# Patient Record
Sex: FEMALE | Race: WHITE | NOT HISPANIC OR LATINO | Employment: UNEMPLOYED | URBAN - NONMETROPOLITAN AREA
[De-identification: names, ages, dates, MRNs, and addresses within clinical notes are randomized per-mention and may not be internally consistent; named-entity substitution may affect disease eponyms.]

---
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
## 2018-03-23 ENCOUNTER — HOSPITAL ENCOUNTER (OUTPATIENT)
Facility: MEDICAL CENTER | Age: 18
DRG: 776 | End: 2018-03-23
Payer: MEDICAID

## 2018-03-23 ENCOUNTER — HOSPITAL ENCOUNTER (INPATIENT)
Facility: MEDICAL CENTER | Age: 18
LOS: 3 days | DRG: 776 | End: 2018-03-26
Attending: HOSPITALIST | Admitting: HOSPITALIST
Payer: MEDICAID

## 2018-03-23 ENCOUNTER — RESOLUTE PROFESSIONAL BILLING HOSPITAL PROF FEE (OUTPATIENT)
Dept: HOSPITALIST | Facility: MEDICAL CENTER | Age: 18
End: 2018-03-23
Payer: MEDICAID

## 2018-03-23 PROBLEM — D72.829 LEUKOCYTOSIS: Status: ACTIVE | Noted: 2018-03-23

## 2018-03-23 PROBLEM — R65.10 SIRS (SYSTEMIC INFLAMMATORY RESPONSE SYNDROME) (HCC): Status: ACTIVE | Noted: 2018-03-23

## 2018-03-23 PROBLEM — D69.6 THROMBOCYTOPENIA (HCC): Status: ACTIVE | Noted: 2018-03-23

## 2018-03-23 PROBLEM — D62 ACUTE BLOOD LOSS ANEMIA: Status: ACTIVE | Noted: 2018-03-23

## 2018-03-23 PROCEDURE — 83605 ASSAY OF LACTIC ACID: CPT

## 2018-03-23 PROCEDURE — 700111 HCHG RX REV CODE 636 W/ 250 OVERRIDE (IP): Performed by: HOSPITALIST

## 2018-03-23 PROCEDURE — 80053 COMPREHEN METABOLIC PANEL: CPT

## 2018-03-23 PROCEDURE — 700102 HCHG RX REV CODE 250 W/ 637 OVERRIDE(OP): Performed by: HOSPITALIST

## 2018-03-23 PROCEDURE — A9270 NON-COVERED ITEM OR SERVICE: HCPCS | Performed by: HOSPITALIST

## 2018-03-23 PROCEDURE — 700105 HCHG RX REV CODE 258: Performed by: HOSPITALIST

## 2018-03-23 PROCEDURE — 85025 COMPLETE CBC W/AUTO DIFF WBC: CPT

## 2018-03-23 PROCEDURE — 99223 1ST HOSP IP/OBS HIGH 75: CPT | Performed by: HOSPITALIST

## 2018-03-23 PROCEDURE — 770022 HCHG ROOM/CARE - ICU (200)

## 2018-03-23 RX ORDER — BISACODYL 10 MG
10 SUPPOSITORY, RECTAL RECTAL
Status: DISCONTINUED | OUTPATIENT
Start: 2018-03-23 | End: 2018-03-26 | Stop reason: HOSPADM

## 2018-03-23 RX ORDER — OXYCODONE HYDROCHLORIDE 5 MG/1
2.5 TABLET ORAL
Status: DISCONTINUED | OUTPATIENT
Start: 2018-03-23 | End: 2018-03-26 | Stop reason: HOSPADM

## 2018-03-23 RX ORDER — PROMETHAZINE HYDROCHLORIDE 25 MG/1
12.5-25 TABLET ORAL EVERY 4 HOURS PRN
Status: DISCONTINUED | OUTPATIENT
Start: 2018-03-23 | End: 2018-03-26 | Stop reason: HOSPADM

## 2018-03-23 RX ORDER — PROMETHAZINE HYDROCHLORIDE 12.5 MG/1
12.5-25 SUPPOSITORY RECTAL EVERY 4 HOURS PRN
Status: DISCONTINUED | OUTPATIENT
Start: 2018-03-23 | End: 2018-03-26 | Stop reason: HOSPADM

## 2018-03-23 RX ORDER — POLYETHYLENE GLYCOL 3350 17 G/17G
1 POWDER, FOR SOLUTION ORAL
Status: DISCONTINUED | OUTPATIENT
Start: 2018-03-23 | End: 2018-03-26 | Stop reason: HOSPADM

## 2018-03-23 RX ORDER — AMOXICILLIN 250 MG
2 CAPSULE ORAL 2 TIMES DAILY
Status: DISCONTINUED | OUTPATIENT
Start: 2018-03-23 | End: 2018-03-26 | Stop reason: HOSPADM

## 2018-03-23 RX ORDER — MORPHINE SULFATE 4 MG/ML
2 INJECTION, SOLUTION INTRAMUSCULAR; INTRAVENOUS
Status: DISCONTINUED | OUTPATIENT
Start: 2018-03-23 | End: 2018-03-26 | Stop reason: HOSPADM

## 2018-03-23 RX ORDER — ONDANSETRON 4 MG/1
4 TABLET, ORALLY DISINTEGRATING ORAL EVERY 4 HOURS PRN
Status: DISCONTINUED | OUTPATIENT
Start: 2018-03-23 | End: 2018-03-26 | Stop reason: HOSPADM

## 2018-03-23 RX ORDER — OXYCODONE HYDROCHLORIDE 5 MG/1
5 TABLET ORAL
Status: DISCONTINUED | OUTPATIENT
Start: 2018-03-23 | End: 2018-03-26 | Stop reason: HOSPADM

## 2018-03-23 RX ORDER — ONDANSETRON 2 MG/ML
4 INJECTION INTRAMUSCULAR; INTRAVENOUS EVERY 4 HOURS PRN
Status: DISCONTINUED | OUTPATIENT
Start: 2018-03-23 | End: 2018-03-26 | Stop reason: HOSPADM

## 2018-03-23 RX ORDER — ACETAMINOPHEN 325 MG/1
650 TABLET ORAL EVERY 6 HOURS PRN
Status: DISCONTINUED | OUTPATIENT
Start: 2018-03-23 | End: 2018-03-26 | Stop reason: HOSPADM

## 2018-03-23 RX ORDER — SODIUM CHLORIDE 9 MG/ML
INJECTION, SOLUTION INTRAVENOUS CONTINUOUS
Status: DISCONTINUED | OUTPATIENT
Start: 2018-03-23 | End: 2018-03-24

## 2018-03-23 RX ADMIN — OXYCODONE HYDROCHLORIDE 5 MG: 5 TABLET ORAL at 20:09

## 2018-03-23 RX ADMIN — OXYCODONE HYDROCHLORIDE 5 MG: 5 TABLET ORAL at 23:36

## 2018-03-23 RX ADMIN — STANDARDIZED SENNA CONCENTRATE AND DOCUSATE SODIUM 2 TABLET: 8.6; 5 TABLET, FILM COATED ORAL at 20:00

## 2018-03-23 RX ADMIN — SODIUM CHLORIDE: 9 INJECTION, SOLUTION INTRAVENOUS at 19:59

## 2018-03-23 RX ADMIN — MEROPENEM 500 MG: 500 INJECTION, POWDER, FOR SOLUTION INTRAVENOUS at 23:29

## 2018-03-23 ASSESSMENT — ENCOUNTER SYMPTOMS
DIZZINESS: 0
COUGH: 1
CHILLS: 0
HEMOPTYSIS: 0
ORTHOPNEA: 0
NAUSEA: 0
PALPITATIONS: 0
VOMITING: 0

## 2018-03-23 ASSESSMENT — PATIENT HEALTH QUESTIONNAIRE - PHQ9
2. FEELING DOWN, DEPRESSED, IRRITABLE, OR HOPELESS: NOT AT ALL
SUM OF ALL RESPONSES TO PHQ9 QUESTIONS 1 AND 2: 0
1. LITTLE INTEREST OR PLEASURE IN DOING THINGS: NOT AT ALL

## 2018-03-23 ASSESSMENT — PAIN SCALES - GENERAL
PAINLEVEL_OUTOF10: 7
PAINLEVEL_OUTOF10: 7

## 2018-03-23 ASSESSMENT — LIFESTYLE VARIABLES: DO YOU DRINK ALCOHOL: NO

## 2018-03-23 NOTE — PROGRESS NOTES
Direct admit from [facility], Dr. Smith, [phone].  Accepted by Dr. Kan for Sepsis.  ADT signed & held @ [id number], needs to be released upon pt arrival.  No written orders received.  Pt coming by airflight.

## 2018-03-23 NOTE — PROGRESS NOTES
Medical records received from [facility]:  H&P, Progress notes, Consults, VS Flowsheets, CXR, and Demographics.  Scanned into Media tab.

## 2018-03-24 PROBLEM — R60.9 EDEMA: Status: ACTIVE | Noted: 2018-03-24

## 2018-03-24 LAB
ALBUMIN SERPL BCP-MCNC: 2 G/DL (ref 3.2–4.9)
ALBUMIN SERPL BCP-MCNC: 2.3 G/DL (ref 3.2–4.9)
ALBUMIN/GLOB SERPL: 0.9 G/DL
ALBUMIN/GLOB SERPL: 1 G/DL
ALP SERPL-CCNC: 68 U/L (ref 30–99)
ALP SERPL-CCNC: 91 U/L (ref 30–99)
ALT SERPL-CCNC: 14 U/L (ref 2–50)
ALT SERPL-CCNC: 17 U/L (ref 2–50)
ANION GAP SERPL CALC-SCNC: 4 MMOL/L (ref 0–11.9)
ANION GAP SERPL CALC-SCNC: 5 MMOL/L (ref 0–11.9)
AST SERPL-CCNC: 24 U/L (ref 12–45)
AST SERPL-CCNC: 27 U/L (ref 12–45)
BASOPHILS # BLD AUTO: 0.3 % (ref 0–1.8)
BASOPHILS # BLD AUTO: 0.3 % (ref 0–1.8)
BASOPHILS # BLD: 0.02 K/UL (ref 0–0.12)
BASOPHILS # BLD: 0.02 K/UL (ref 0–0.12)
BILIRUB SERPL-MCNC: 0.3 MG/DL (ref 0.1–1.5)
BILIRUB SERPL-MCNC: 0.3 MG/DL (ref 0.1–1.5)
BUN SERPL-MCNC: 4 MG/DL (ref 8–22)
BUN SERPL-MCNC: 4 MG/DL (ref 8–22)
CALCIUM SERPL-MCNC: 7.2 MG/DL (ref 8.5–10.5)
CALCIUM SERPL-MCNC: 7.6 MG/DL (ref 8.5–10.5)
CHLORIDE SERPL-SCNC: 107 MMOL/L (ref 96–112)
CHLORIDE SERPL-SCNC: 109 MMOL/L (ref 96–112)
CO2 SERPL-SCNC: 22 MMOL/L (ref 20–33)
CO2 SERPL-SCNC: 23 MMOL/L (ref 20–33)
CREAT SERPL-MCNC: 0.7 MG/DL (ref 0.5–1.4)
CREAT SERPL-MCNC: 0.73 MG/DL (ref 0.5–1.4)
EOSINOPHIL # BLD AUTO: 0.01 K/UL (ref 0–0.51)
EOSINOPHIL # BLD AUTO: 0.01 K/UL (ref 0–0.51)
EOSINOPHIL NFR BLD: 0.1 % (ref 0–6.9)
EOSINOPHIL NFR BLD: 0.1 % (ref 0–6.9)
ERYTHROCYTE [DISTWIDTH] IN BLOOD BY AUTOMATED COUNT: 43.6 FL (ref 35.9–50)
ERYTHROCYTE [DISTWIDTH] IN BLOOD BY AUTOMATED COUNT: 45.1 FL (ref 35.9–50)
GLOBULIN SER CALC-MCNC: 2.1 G/DL (ref 1.9–3.5)
GLOBULIN SER CALC-MCNC: 2.5 G/DL (ref 1.9–3.5)
GLUCOSE SERPL-MCNC: 122 MG/DL (ref 65–99)
GLUCOSE SERPL-MCNC: 99 MG/DL (ref 65–99)
HCT VFR BLD AUTO: 21.9 % (ref 37–47)
HCT VFR BLD AUTO: 31.5 % (ref 37–47)
HGB BLD-MCNC: 10.8 G/DL (ref 12–16)
HGB BLD-MCNC: 7.6 G/DL (ref 12–16)
IMM GRANULOCYTES # BLD AUTO: 0.04 K/UL (ref 0–0.11)
IMM GRANULOCYTES # BLD AUTO: 0.06 K/UL (ref 0–0.11)
IMM GRANULOCYTES NFR BLD AUTO: 0.5 % (ref 0–0.9)
IMM GRANULOCYTES NFR BLD AUTO: 0.9 % (ref 0–0.9)
LACTATE BLD-SCNC: 0.8 MMOL/L (ref 0.5–2)
LYMPHOCYTES # BLD AUTO: 0.91 K/UL (ref 1–4.8)
LYMPHOCYTES # BLD AUTO: 1.18 K/UL (ref 1–4.8)
LYMPHOCYTES NFR BLD: 12.2 % (ref 22–41)
LYMPHOCYTES NFR BLD: 17.4 % (ref 22–41)
MAGNESIUM SERPL-MCNC: 1.6 MG/DL (ref 1.5–2.5)
MCH RBC QN AUTO: 29.6 PG (ref 27–33)
MCH RBC QN AUTO: 30.1 PG (ref 27–33)
MCHC RBC AUTO-ENTMCNC: 34.3 G/DL (ref 33.6–35)
MCHC RBC AUTO-ENTMCNC: 34.7 G/DL (ref 33.6–35)
MCV RBC AUTO: 85.2 FL (ref 81.4–97.8)
MCV RBC AUTO: 87.7 FL (ref 81.4–97.8)
MONOCYTES # BLD AUTO: 0.39 K/UL (ref 0–0.85)
MONOCYTES # BLD AUTO: 0.42 K/UL (ref 0–0.85)
MONOCYTES NFR BLD AUTO: 5.6 % (ref 0–13.4)
MONOCYTES NFR BLD AUTO: 5.8 % (ref 0–13.4)
NEUTROPHILS # BLD AUTO: 5.12 K/UL (ref 2–7.15)
NEUTROPHILS # BLD AUTO: 6.07 K/UL (ref 2–7.15)
NEUTROPHILS NFR BLD: 75.5 % (ref 44–72)
NEUTROPHILS NFR BLD: 81.3 % (ref 44–72)
NRBC # BLD AUTO: 0 K/UL
NRBC # BLD AUTO: 0 K/UL
NRBC BLD-RTO: 0 /100 WBC
NRBC BLD-RTO: 0 /100 WBC
PHOSPHATE SERPL-MCNC: 2.8 MG/DL (ref 2.5–4.5)
PLATELET # BLD AUTO: 104 K/UL (ref 164–446)
PLATELET # BLD AUTO: 88 K/UL (ref 164–446)
PMV BLD AUTO: 10.1 FL (ref 9–12.9)
PMV BLD AUTO: 9.4 FL (ref 9–12.9)
POTASSIUM SERPL-SCNC: 3.2 MMOL/L (ref 3.6–5.5)
POTASSIUM SERPL-SCNC: 3.5 MMOL/L (ref 3.6–5.5)
PROT SERPL-MCNC: 4.1 G/DL (ref 6–8.2)
PROT SERPL-MCNC: 4.8 G/DL (ref 6–8.2)
RBC # BLD AUTO: 2.57 M/UL (ref 4.2–5.4)
RBC # BLD AUTO: 3.59 M/UL (ref 4.2–5.4)
SODIUM SERPL-SCNC: 134 MMOL/L (ref 135–145)
SODIUM SERPL-SCNC: 136 MMOL/L (ref 135–145)
WBC # BLD AUTO: 6.8 K/UL (ref 4.8–10.8)
WBC # BLD AUTO: 7.5 K/UL (ref 4.8–10.8)

## 2018-03-24 PROCEDURE — 700102 HCHG RX REV CODE 250 W/ 637 OVERRIDE(OP): Performed by: INTERNAL MEDICINE

## 2018-03-24 PROCEDURE — 84100 ASSAY OF PHOSPHORUS: CPT

## 2018-03-24 PROCEDURE — 700102 HCHG RX REV CODE 250 W/ 637 OVERRIDE(OP): Performed by: HOSPITALIST

## 2018-03-24 PROCEDURE — 99232 SBSQ HOSP IP/OBS MODERATE 35: CPT | Performed by: HOSPITALIST

## 2018-03-24 PROCEDURE — 80053 COMPREHEN METABOLIC PANEL: CPT

## 2018-03-24 PROCEDURE — A9270 NON-COVERED ITEM OR SERVICE: HCPCS | Performed by: HOSPITALIST

## 2018-03-24 PROCEDURE — 770001 HCHG ROOM/CARE - MED/SURG/GYN PRIV*

## 2018-03-24 PROCEDURE — 83735 ASSAY OF MAGNESIUM: CPT

## 2018-03-24 PROCEDURE — 700111 HCHG RX REV CODE 636 W/ 250 OVERRIDE (IP): Performed by: HOSPITALIST

## 2018-03-24 PROCEDURE — 85025 COMPLETE CBC W/AUTO DIFF WBC: CPT

## 2018-03-24 PROCEDURE — A9270 NON-COVERED ITEM OR SERVICE: HCPCS | Performed by: INTERNAL MEDICINE

## 2018-03-24 RX ORDER — DULOXETIN HYDROCHLORIDE 30 MG/1
60 CAPSULE, DELAYED RELEASE ORAL DAILY
Status: DISCONTINUED | OUTPATIENT
Start: 2018-03-24 | End: 2018-03-26 | Stop reason: HOSPADM

## 2018-03-24 RX ORDER — POTASSIUM CHLORIDE 20 MEQ/1
40 TABLET, EXTENDED RELEASE ORAL 2 TIMES DAILY
Status: DISCONTINUED | OUTPATIENT
Start: 2018-03-24 | End: 2018-03-24

## 2018-03-24 RX ORDER — MAGNESIUM SULFATE HEPTAHYDRATE 40 MG/ML
4 INJECTION, SOLUTION INTRAVENOUS ONCE
Status: COMPLETED | OUTPATIENT
Start: 2018-03-24 | End: 2018-03-24

## 2018-03-24 RX ORDER — POTASSIUM CHLORIDE 20 MEQ/1
40 TABLET, EXTENDED RELEASE ORAL 3 TIMES DAILY
Status: DISCONTINUED | OUTPATIENT
Start: 2018-03-24 | End: 2018-03-25

## 2018-03-24 RX ORDER — FUROSEMIDE 10 MG/ML
20 INJECTION INTRAMUSCULAR; INTRAVENOUS ONCE
Status: COMPLETED | OUTPATIENT
Start: 2018-03-24 | End: 2018-03-24

## 2018-03-24 RX ADMIN — POLYETHYLENE GLYCOL 3350 1 PACKET: 17 POWDER, FOR SOLUTION ORAL at 07:54

## 2018-03-24 RX ADMIN — OXYCODONE HYDROCHLORIDE 5 MG: 5 TABLET ORAL at 15:41

## 2018-03-24 RX ADMIN — POTASSIUM CHLORIDE 40 MEQ: 1500 TABLET, EXTENDED RELEASE ORAL at 14:40

## 2018-03-24 RX ADMIN — ONDANSETRON 4 MG: 2 INJECTION INTRAMUSCULAR; INTRAVENOUS at 23:45

## 2018-03-24 RX ADMIN — OXYCODONE HYDROCHLORIDE 5 MG: 5 TABLET ORAL at 06:05

## 2018-03-24 RX ADMIN — OXYCODONE HYDROCHLORIDE 5 MG: 5 TABLET ORAL at 23:00

## 2018-03-24 RX ADMIN — MORPHINE SULFATE 2 MG: 4 INJECTION INTRAVENOUS at 23:42

## 2018-03-24 RX ADMIN — STANDARDIZED SENNA CONCENTRATE AND DOCUSATE SODIUM 2 TABLET: 8.6; 5 TABLET, FILM COATED ORAL at 19:36

## 2018-03-24 RX ADMIN — POTASSIUM CHLORIDE 40 MEQ: 1500 TABLET, EXTENDED RELEASE ORAL at 19:35

## 2018-03-24 RX ADMIN — MORPHINE SULFATE 2 MG: 4 INJECTION INTRAVENOUS at 20:24

## 2018-03-24 RX ADMIN — STANDARDIZED SENNA CONCENTRATE AND DOCUSATE SODIUM 2 TABLET: 8.6; 5 TABLET, FILM COATED ORAL at 07:54

## 2018-03-24 RX ADMIN — OXYCODONE HYDROCHLORIDE 5 MG: 5 TABLET ORAL at 19:35

## 2018-03-24 RX ADMIN — DULOXETINE HYDROCHLORIDE 60 MG: 60 CAPSULE, DELAYED RELEASE ORAL at 21:37

## 2018-03-24 RX ADMIN — MAGNESIUM SULFATE IN WATER 4 G: 40 INJECTION, SOLUTION INTRAVENOUS at 18:30

## 2018-03-24 RX ADMIN — FUROSEMIDE 20 MG: 10 INJECTION, SOLUTION INTRAMUSCULAR; INTRAVENOUS at 13:30

## 2018-03-24 RX ADMIN — MAGNESIUM HYDROXIDE 30 ML: 400 SUSPENSION ORAL at 07:54

## 2018-03-24 RX ADMIN — OXYCODONE HYDROCHLORIDE 5 MG: 5 TABLET ORAL at 12:41

## 2018-03-24 RX ADMIN — POTASSIUM CHLORIDE 40 MEQ: 1500 TABLET, EXTENDED RELEASE ORAL at 10:37

## 2018-03-24 ASSESSMENT — PAIN SCALES - GENERAL
PAINLEVEL_OUTOF10: 2
PAINLEVEL_OUTOF10: 10
PAINLEVEL_OUTOF10: 0
PAINLEVEL_OUTOF10: 3
PAINLEVEL_OUTOF10: 7
PAINLEVEL_OUTOF10: 10
PAINLEVEL_OUTOF10: 0
PAINLEVEL_OUTOF10: 0
PAINLEVEL_OUTOF10: 7
PAINLEVEL_OUTOF10: 10
PAINLEVEL_OUTOF10: 0

## 2018-03-24 ASSESSMENT — ENCOUNTER SYMPTOMS
MEMORY LOSS: 0
FEVER: 1
WEAKNESS: 0
FALLS: 0
HEADACHES: 0
EYE DISCHARGE: 0
VOMITING: 0
PALPITATIONS: 0
NAUSEA: 0
NAUSEA: 1
FEVER: 0
BLURRED VISION: 0
HEMOPTYSIS: 0
NECK PAIN: 0
ORTHOPNEA: 0
CHILLS: 1
FLANK PAIN: 0
NERVOUS/ANXIOUS: 0
DIARRHEA: 0
BACK PAIN: 0
FOCAL WEAKNESS: 0
HALLUCINATIONS: 0
LOSS OF CONSCIOUSNESS: 0
SEIZURES: 0
SPUTUM PRODUCTION: 0
ABDOMINAL PAIN: 1
STRIDOR: 0
COUGH: 0
SHORTNESS OF BREATH: 0
BLOOD IN STOOL: 0
EYE REDNESS: 0
EYE PAIN: 0
SPEECH CHANGE: 0

## 2018-03-24 ASSESSMENT — LIFESTYLE VARIABLES
SUBSTANCE_ABUSE: 0
DO YOU DRINK ALCOHOL: NO

## 2018-03-24 NOTE — PROGRESS NOTES
Pulmonary Critical Care Progress Note        Date of Service: 3/24/2018  Chief Complaint: fevers and chills    History of Present Illness: 17 y.o. female admitted from [facility] for SIRS syndrome and possible severe sepsis.  She underwent vaginal delivery on 3/21 for a full term pregnancy and the baby had respiratory distress with low Apgars and 30 minute resuscitation with transfer to [facility].  The patient 's delivery was complicated by postpartum hemorrhage and suspected uterine rupture requiring hysterectomy.  She did receive 5 units pRBCs and 2 units of FFP.  On the day of transfer, the patient had sudden onset of rigors and fevers with tachycardia.  She was given vanco, gent, and meropenem and transferred to [facility] for higher level of care.    ROS:  No shortness of breath/CP/palpitations/fever/chills/n/v/d, c/o rash to abdomen that isn't itchy.  Mild abdominal distension and discomfort.  Mild vaginal discharge.  No dysuria/hematuria.  Hungry. Also c/o swelling to hands and feet    Interval Events:  24 hour interval history reviewed    - transferred from [facility]   - s/p vaginal birth with placenta previa and hemorrhaged with massive transfusion   - no issues overnight   - a/ox4   - slight pain to abdomen   - SR/ST 90-100s   - -120s   - IS 1750cc   - last BM on 3/21   - O2 2 lpm NC   - tolerating regular diet   - Tmax 99.9   - UOP adequate with commode   - NS at 100cc   - having nausea/vomiting this morning   - no CXR today   - meropenem day #2 (has had vanco/gent)   - K at 3.5   - no DVT prophylaxis   - platelets at 88   - Hb of 7.6    PFSH:  No change.  Gen: pleasant/cooperative, speaking in full sentences, appears stated age, no distress while ambulating around ICU room  Skin: warm/dry, noted faint papular rash across abdomen, no bruising  Ext: FROM noted to RUE, RLE, LLE, LUE without joint effusions or erythema, no popliteal adenopathy, trace pedal edema bilat  Neck: supple with FROM,  no thyromegaly, no cervical adenopathy    Respiratory:     Pulse Oximetry: 93 %    Exam: unlabored respirations, no intercostal retractions or accessory muscle use and clear to auscultation without rales or wheezes  ImagingAvailable data reviewed         Invalid input(s): [id number]    HemoDynamics:  Pulse: (!) 109, Heart Rate (Monitored): (!) 111  Blood Pressure: 109/47, NIBP: 119/72       Exam: RRR, no murmur, good cap refill, 2+ dpp=, trace pedal edema  Imaging: Available data reviewed        Neuro:  GCS         Exam: no focal deficits noted mental status intact oriented for age x3 Motor and sensory exam grossly intact  Imaging: Available data reviewed    Fluids:  Intake/Output     None        Weight: 82 kg (180 lb 12.4 oz)  Recent Labs      18   235   SODIUM  134*   POTASSIUM  3.5*   CHLORIDE  107   CO2  23   BUN  4*   CREATININE  0.70   CALCIUM  7.2*       GI/Nutrition:  Exam: mild tenderness suprapubic, no guarding, abdominal incision is clean, dry, and intact, normal active bowel sounds, without masses or organomegaly  Imaging: Available data reviewed  taking PO  Liver Function  Recent Labs      18   2359   ALTSGPT  14   ASTSGOT  24   ALKPHOSPHAT  68   TBILIRUBIN  0.3   GLUCOSE  99       Heme:  Recent Labs      18   235   RBC  2.57*   HEMOGLOBIN  7.6*   HEMATOCRIT  21.9*   PLATELETCT  88*       Infectious Disease:  Temp  Av.2 °C (98.9 °F)  Min: 36.2 °C (97.2 °F)  Max: 37.7 °C (99.9 °F)  Micro: antibiotics reviewed and cultures reviewed  Recent Labs      18   2359   WBC  6.8   NEUTSPOLYS  75.50*   LYMPHOCYTES  17.40*   MONOCYTES  5.80   EOSINOPHILS  0.10   BASOPHILS  0.30   ASTSGOT  24   ALTSGPT  14   ALKPHOSPHAT  68   TBILIRUBIN  0.3     Current Facility-Administered Medications   Medication Dose Frequency Provider Last Rate Last Dose   • senna-docusate (PERICOLACE or SENOKOT S) 8.6-50 MG per tablet 2 Tab  2 Tab BID Oleg Kan M.D.   2 Tab at 18    And   •  polyethylene glycol/lytes (MIRALAX) PACKET 1 Packet  1 Packet QDAY PRN Oleg Kan M.D.        And   • magnesium hydroxide (MILK OF MAGNESIA) suspension 30 mL  30 mL QDAY PRN Oleg Kan M.D.        And   • bisacodyl (DULCOLAX) suppository 10 mg  10 mg QDAY PRN Oleg Kan M.D.       • NS infusion   Continuous Oleg Kan M.D. 100 mL/hr at 03/23/18 1959     • ondansetron (ZOFRAN) syringe/vial injection 4 mg  4 mg Q4HRS PRN Oleg Kan M.D.       • ondansetron (ZOFRAN ODT) dispertab 4 mg  4 mg Q4HRS PRN Oleg Kan M.D.       • promethazine (PHENERGAN) tablet 12.5-25 mg  12.5-25 mg Q4HRS PRSHINE Kan M.D.       • promethazine (PHENERGAN) suppository 12.5-25 mg  12.5-25 mg Q4HRS PRN Oleg Kan M.D.       • prochlorperazine (COMPAZINE) injection 5-10 mg  5-10 mg Q4HRS PRSHINE Kan M.D.       • acetaminophen (TYLENOL) tablet 650 mg  650 mg Q6HRS PRSHINE Kan M.D.       • Pharmacy Consult Request ...Pain Management Review   PRN Oleg Kan M.D.        And   • oxyCODONE immediate-release (ROXICODONE) tablet 2.5 mg  2.5 mg Q3HRS PRN Oleg Kan M.D.        And   • oxyCODONE immediate-release (ROXICODONE) tablet 5 mg  5 mg Q3HRS PRSHINE Kan M.D.   5 mg at 03/24/18 0605    And   • morphine (pf) 4 mg/ml injection 2 mg  2 mg Q3HRS PRSHINE Kan M.D.         Last reviewed on 7/23/2017  2:24 PM by Harleen Dukes P.A.-C.    Quality  Measures:  EKG reviewed, Medications reviewed, Labs reviewed and Radiology images reviewed  Douglas catheter: No Douglas        DVT prophylaxis - mechanical: SCDs  Ulcer prophylaxis: Not indicated  Antibiotics: Treating active infection/contamination beyond 24 hours perioperative coverage      Problems/Plan:    Acute SIRS syndrome   - fever/chills and leukocytosis have resolved   - rash still present   - s/p meropenem/vanco   - s/p IVF   Acute Rash   - query drug reaction vs transfusion reaction   - follow   - no medications now  Acute Uterine Ruptured   -  s/p hysterectomy POD#2   - s/p massive transfusion resuscitation   - hb stable  Postpartum day 2 after vaginal delivery   - transfer to postpartum   - following hb  Thrombocytopenia   - watch for HELLP   - check CMP tomorrow  Acute Peripheral Edema   - checking LE ultrasound for DVT  Prophylaxis   - SCDs    Pt is stable for transfer out of the ICU to postpartum.  The [facility] intensivist group will sign off upon transfer.    Discussed patient condition and risk of morbidity and/or mortality with Family, RN, RT, Therapies, Pharmacy, Dietary, , Charge nurse / hot rounds, Patient and hospitalist.    [id number]

## 2018-03-24 NOTE — PROGRESS NOTES
Patient assessed and is having some complaints of pain. See MAR for intervention. No other complaints or needs are voiced at this time. Call light within reach. Will continue to monitor.

## 2018-03-24 NOTE — PROGRESS NOTES
Bed side report received from day shift nurse. Patient assessed and resting comfortably. No complaints of pain or needs are voiced at this time. Call light within reach. Will continue to monitor.

## 2018-03-24 NOTE — PROGRESS NOTES
Patient assessed and resting comfortably at this time. No complaints of pain or other needs are voiced. No significant changes noted since previous assessment. Call light within reach. Continuing to monitor.

## 2018-03-24 NOTE — CONSULTS
Critical Care/Pulmonary Consultation    Date of service: 3/23/2018    Consulting Physician: Oleg Kan M.D.    Chief Complaint:  Hypotension, query sepsis    History of Present Illness: This is a 17-year-old female who underwent a vaginal delivery on 3/21. The baby was born with respiratory distress and low Apgars and was transferred to [facility]. Patient's delivery was complicated by postpartum hemorrhage requiring D&C which was unsuccessful and subsequent hysterectomy. She received 5 units of PRBCs in addition to FFP. Today she had a fever associated with tachycardia and shaking riders. She additionally had some flushing of her face. She was given IV fluid resuscitation and initiated on broad-spectrum antibiotics including meropenem and vancomycin. She was transferred to [facility] for further care. CT scan of the chest showed no evidence of pulmonary embolism. There were small bilateral pleural effusions. Symptoms are now improved. She did have a MAXIMUM TEMPERATURE of 101°F.    Review of Systems   Constitutional: Positive for chills and fever.   Eyes: Negative for blurred vision.   Respiratory: Negative for cough and hemoptysis.    Cardiovascular: Negative for chest pain.   Gastrointestinal: Positive for abdominal pain and nausea.   Skin: Negative for rash.       No current facility-administered medications on file prior to encounter.      Current Outpatient Prescriptions on File Prior to Encounter   Medication Sig Dispense Refill   • MethylPREDNISolone (MEDROL DOSEPAK) 4 MG Tablet Therapy Pack Take 1 Tab by mouth See Admin Instructions. 21 Tab 0   • Dextromethorphan Polistirex ER (ROBITUSSIN 12 HOUR COUGH) 30 MG/5ML Suspension Extended Release Take 60 mg by mouth 2 Times a Day.     • guaifenesin-codeine (ROBITUSSIN AC) Solution oral solution Take 5 mL by mouth at bedtime as needed for Cough. 120 mL 0   • fluticasone (FLONASE) 50 MCG/ACT nasal spray Spray 1 Spray in nose 2 times a day. 1  "Bottle 0   • phenazopyridine (PYRIDIUM) 200 MG Tab Take 1 Tab by mouth 3 times a day as needed. 6 Tab 0   • DULOXETINE HCL PO Take  by mouth.     • naproxen (NAPROSYN) 375 MG Tab Take 1 Tab by mouth 2 times a day, with meals. 60 Tab 0   • TRI-SPRINTEC 0.18/0.215/0.25 MG-35 MCG TABS TAKE 1 TABLET BY MOUTH DAILY 1 Tab 1       Social History   Substance Use Topics   • Smoking status: Never Smoker   • Smokeless tobacco: Never Used   • Alcohol use No        Past Medical History:   Diagnosis Date   • Allergy    • Asthma, exercise induced    • FHx: cancer     MGGM brain and colon       Past Surgical History:   Procedure Laterality Date   • FINGER ORIF  11/19/2010    Performed by WANG ROGERS at SURGERY SAME DAY [facility]S       Allergies: Clindamycin; Pcn [penicillins]; and Suprax [cefixime]    Family History   Problem Relation Age of Onset   • Psychiatry Mother      ANXIETY   • Lung Disease Maternal Aunt      ASTHMA   • Lung Disease Maternal Uncle      ASTHMA   • Heart Disease Maternal Grandfather      heart attack at age 45       Vitals:    03/23/18 1500 03/23/18 2000 03/23/18 2017   Height: 1.651 m (5' 5\")     Weight: 82 kg (180 lb 12.4 oz)     Weight % change since last entry.: 0 %     BP:  109/47    Pulse:   (!) 109   BMI (Calculated): 30.08     Resp:   (!) 47   Temp:  37.6 °C (99.6 °F)        Physical Examination  General: Awake, not in distress,  Neuro/Psych: Oriented ×4, moves all extremities symmetrically, strength 5/5 throughout, normal sensory exam, normal mentation and judgment  HEENT: PERRL, trachea midline, supple, no crepitus  CVS: Regular rate and rhythm, no murmur  Respiratory: Clear throughout, no wheezing  Abdomen/: Low transverse incision well approximated staples in place, abdomen soft, mild tenderness  Extremities: 1+ lower extremity edema, distal pulses palpable throughout, capillary refill less than 2 seconds  Skin: Warm, dry, no rash        No results for input(s): SODIUM, POTASSIUM, " CHLORIDE, CO2, BUN, CREATININE, MAGNESIUM, PHOSPHORUS, CALCIUM in the last 72 hours.  No results for input(s): ALTSGPT, ASTSGOT, ALKPHOSPHAT, TBILIRUBIN, DBILIRUBIN, GAMMAGT, AMYLASE, LIPASE, ALB, PREALBUMIN, GLUCOSE in the last 72 hours.        Invalid input(s): [id number]  No orders to display       Assessment and Plan:  Fevers, chills, rash   - Resolving, query transfusion reaction versus sepsis   - Possible pelvic/GYN source   - Status post IV fluid resuscitation   - Ongoing broad-spectrum antimicrobial therapy, follow cultures  Postpartum day 2 after vaginal delivery   - Postpartum hemorrhage necessitating D&C and ultimately emergent hysterectomy   - Follow serial hemoglobin, physical exam, consult with OB in the morning  Thrombocytopenia   - Follow, TEG if further bleeding  The patient is critically ill. She is at risk for further vital organ deterioration. We will follow closely in the intensive care unit further recommendations to follow crit. Management as above.  The patient remains critically ill.  Critical care time = 36 minutes in directly providing and coordinating critical care and extensive data review.  No time overlap and excludes procedures.

## 2018-03-24 NOTE — H&P
Hospital Medicine History and Physical    Date of Service  3/23/2018    Chief Complaint  Fever and Chills    History of Presenting Illness  17 y.o. female who presented 3/23/2018 with fever and chills.    Ms Merlos has minimal past medical history.  She had a vaginal delivery on 3/21, subsequently had uncontrolled hemorrhage.  She was taken emergently to the OR, D and C was unsuccessful in controlling the bleeding and she required a hysterectomy, estimated blood loss was 2,500ml. Patient required a total of 5 units of PRBC's and 3 units of plasma.  She has been improving under the care of her OB/GYN and the hospitalist at [facility].  Today the patient spiked a fever and became tachycardic.  She was very uncomfortable with rigors and developed flushing of her face.  Endorses palpitations.  She was given IV fluids and started on broad spectrum antibiotics.  The hospitalist contacted me to request transfer to [facility] for higher level of care and specialty coverage.  Prior to transfer the patient did have a CT scan of the abdomen and pelvis which showed only post operative changes with some clots reported as large as 7 x 6 cm.  Also had a CT scan of the chest shows no evidence of pulmonary embolism, there are small bilateral pleural effusions.  Patient rec'd vancomycin and gentamycin perioperative, these antibiotics ended on 3/22.  Today she rec'd meropenum and vancomycin on concern that her fever and tachycardia were related to infection. She arrives at the ICU with a fever of 101 degrees F, overall she is feeling much better, tachycardia and palpitations have resolved.  Complains of incisional pain, worse when she coughs.    Primary Care Physician  Lm Vivar M.D.    OB Gyn: Dr Mamadou Franklin  Consultants  None    Code Status  Full Code    Review of Systems  Review of Systems   Constitutional: Negative for chills and malaise/fatigue.   Respiratory: Positive for cough. Negative for hemoptysis.    Cardiovascular:  Negative for chest pain, palpitations and orthopnea.   Gastrointestinal: Negative for nausea and vomiting.   Genitourinary: Negative for dysuria and urgency.   Skin: Negative for itching and rash.   Neurological: Negative for dizziness.   All other systems reviewed and are negative.       Past Medical History  Past Medical History:   Diagnosis Date   • Allergy    • Asthma, exercise induced    • FHx: cancer     MGGM brain and colon       Surgical History  Past Surgical History:   Procedure Laterality Date   • FINGER ORIF  11/19/2010    Performed by WANG ROGERS at SURGERY SAME DAY [facility]       Medications  No current facility-administered medications on file prior to encounter.      Current Outpatient Prescriptions on File Prior to Encounter   Medication Sig Dispense Refill   • MethylPREDNISolone (MEDROL DOSEPAK) 4 MG Tablet Therapy Pack Take 1 Tab by mouth See Admin Instructions. 21 Tab 0   • Dextromethorphan Polistirex ER (ROBITUSSIN 12 HOUR COUGH) 30 MG/5ML Suspension Extended Release Take 60 mg by mouth 2 Times a Day.     • guaifenesin-codeine (ROBITUSSIN AC) Solution oral solution Take 5 mL by mouth at bedtime as needed for Cough. 120 mL 0   • fluticasone (FLONASE) 50 MCG/ACT nasal spray Spray 1 Spray in nose 2 times a day. 1 Bottle 0   • phenazopyridine (PYRIDIUM) 200 MG Tab Take 1 Tab by mouth 3 times a day as needed. 6 Tab 0   • DULOXETINE HCL PO Take  by mouth.     • naproxen (NAPROSYN) 375 MG Tab Take 1 Tab by mouth 2 times a day, with meals. 60 Tab 0   • TRI-SPRINTEC 0.18/0.215/0.25 MG-35 MCG TABS TAKE 1 TABLET BY MOUTH DAILY 1 Tab 1       Family History  Family History   Problem Relation Age of Onset   • Psychiatry Mother      ANXIETY   • Lung Disease Maternal Aunt      ASTHMA   • Lung Disease Maternal Uncle      ASTHMA   • Heart Disease Maternal Grandfather      heart attack at age 45       Social History  Social History   Substance Use Topics   • Smoking status: Never Smoker   • Smokeless  "tobacco: Never Used   • Alcohol use No       Allergies  Allergies   Allergen Reactions   • Clindamycin      Severe stomach pains     • Pcn [Penicillins] Vomiting     SEVERE VOMITING   • Suprax [Cefixime] Rash and Vomiting        Physical Exam  Laboratory   Ht 1.651 m (5' 5\")   Wt 82 kg (180 lb 12.4 oz)   BMI 30.08 kg/m²    138/80, , RR 12, O2 saturation 95% on RA          Physical Exam   Constitutional: She is oriented to person, place, and time. She appears well-developed and well-nourished.   HENT:   Head: Normocephalic and atraumatic.   Eyes: Conjunctivae and EOM are normal. Right eye exhibits no discharge. Left eye exhibits no discharge.   Cardiovascular: Normal rate, regular rhythm and intact distal pulses.    No murmur heard.  Pulmonary/Chest: Effort normal and breath sounds normal. No respiratory distress. She has no wheezes.   Abdominal: Soft. Bowel sounds are normal. She exhibits no distension. There is no tenderness. There is no rebound.   Transverse incision with staples   Musculoskeletal: Normal range of motion. She exhibits no edema.   Neurological: She is alert and oriented to person, place, and time. No cranial nerve deficit.   Skin: Skin is warm and dry. No rash noted. She is not diaphoretic. No erythema. No pallor.   Psychiatric: She has a normal mood and affect. Her behavior is normal. Thought content normal.       (from OSH):  WBC 7.8, HGB 8.3, Plt 98, BUN 6, Creatine 0.78 , Fibrinogen WNL   Assessment/Plan     I anticipate this patient will require at least two midnights for appropriate medical management, necessitating inpatient admission.    * SIRS (systemic inflammatory response syndrome) (CMS-HCC)- (present on admission)   Assessment & Plan    Patient had fever, chills, and tachycardia  Unclear if this is due to infection or other, ?tranfusion related  She is improved  Will continue broad spectrum antibiotics for now  Await final culture results  Lactic acid is normal, blood pressure " is stable        Leukocytosis- (present on admission)   Assessment & Plan    Reactive vs due to new infection  Follow labs, treat as above        Postpartum hemorrhage- (present on admission)   Assessment & Plan    Now s/p emergent hysterectomy        Acute blood loss anemia- (present on admission)   Assessment & Plan    S/P 5 units PRBC's and 3 units plasma  Monitor for bleeding  Check CBC now  No evidence of hemolysis per outside hospital        Thrombocytopenia (CMS-HCC)- (present on admission)   Assessment & Plan    Mild thrombocytopenia, no hemolysis  Will follow            VTE prophylaxis: SCD's.

## 2018-03-24 NOTE — CARE PLAN
Problem: Communication  Goal: The ability to communicate needs accurately and effectively will improve  Outcome: PROGRESSING AS EXPECTED  Patient communicates needs accurately and effectively by using the call light when in need of assistance. Call light within reach. Continuing to monitor.

## 2018-03-24 NOTE — CARE PLAN
Problem: Bowel/Gastric:  Goal: Will not experience complications related to bowel motility  All bowel meds given this AM to prevent further constipation    Problem: Pain Management  Goal: Pain level will decrease to patient's comfort goal  Spoke with pt about current pain regimen, pt states current meds are working adequately

## 2018-03-24 NOTE — ASSESSMENT & PLAN NOTE
s/p emergent hysterectomy  No signs of active bleed at this time  Check iron levels and replete accordingly  I spoke with   On 3-24 since pt is stable and hg improved no further recommendationsfrom OB  Reviewed CT report from outlying facility clots noted but no active bleeding    Patient counseled on benefits of breast-feeding she is not interested in breast-feeding at this time

## 2018-03-24 NOTE — PROGRESS NOTES
[facility] Hospitalist Progress Note    Date of Service: 3/24/2018    Chief Complaint  17 y.o. female admitted 3/23/2018 with possible sepsis admitted to hospital in [place]  for delivery complicated by significant bleeding requiring hysterectomy. She had fever chills and facial rash and there was concern for possible sepsis she was given broad-spectrum antibiotics and transferred to our facility.    Interval Problem Update  She is afebrile this morning, denies dyspnea, has generalized edema    Consultants/Specialty  CC    Disposition  TBD        Review of Systems   Constitutional: Negative for fever and malaise/fatigue.   HENT: Negative for congestion, ear discharge and nosebleeds.    Eyes: Negative for blurred vision, pain, discharge and redness.   Respiratory: Negative for cough, hemoptysis, sputum production, shortness of breath and stridor.    Cardiovascular: Positive for leg swelling. Negative for chest pain, palpitations and orthopnea.   Gastrointestinal: Positive for abdominal pain. Negative for blood in stool, diarrhea, melena, nausea and vomiting.   Genitourinary: Negative for dysuria, flank pain and hematuria.   Musculoskeletal: Negative for back pain, falls, joint pain and neck pain.   Skin: Negative.    Neurological: Negative for speech change, focal weakness, seizures, loss of consciousness, weakness and headaches.   Psychiatric/Behavioral: Negative for hallucinations, memory loss and substance abuse. The patient is not nervous/anxious.    All other systems reviewed and are negative.     Physical Exam  Laboratory/Imaging   Hemodynamics  Temp (24hrs), Av °C (98.6 °F), Min:36.2 °C (97.2 °F), Max:37.7 °C (99.9 °F)   Temperature: 37.1 °C (98.7 °F)  Pulse  Av  Min: 80  Max: 109 Heart Rate (Monitored): 96  Blood Pressure: 109/47, NIBP: 119/77      Respiratory      Respiration: 20, Pulse Oximetry: 96 %             Fluids    Intake/Output Summary (Last 24 hours) at 18 6969  Last data filed at  03/24/18 1400   Gross per 24 hour   Intake              360 ml   Output             1600 ml   Net            -1240 ml       Nutrition  Orders Placed This Encounter   Procedures   • Diet Order     Standing Status:   Standing     Number of Occurrences:   1     Order Specific Question:   Diet:     Answer:   Regular [1]     Physical Exam   Constitutional: She is oriented to person, place, and time. She appears well-developed and well-nourished.   HENT:   Head: Normocephalic and atraumatic.   Right Ear: External ear normal.   Left Ear: External ear normal.   Mouth/Throat: No oropharyngeal exudate.   Eyes: Conjunctivae are normal. Right eye exhibits no discharge. Left eye exhibits no discharge. No scleral icterus.   Neck: Neck supple. No JVD present. No tracheal deviation present.   Cardiovascular: Normal rate and regular rhythm.  Exam reveals no gallop and no friction rub.    No murmur heard.  Pulmonary/Chest: Effort normal and breath sounds normal. No stridor. No respiratory distress. She has no wheezes. She has no rales. She exhibits no tenderness.   Abdominal: Soft. Bowel sounds are normal. She exhibits distension. She exhibits no mass. There is tenderness. There is no rebound and no guarding.   Abdominal wall edema  Tenderness lower abdomen around her incision  Surgical wound with staples in place healing with no signs of infection   Musculoskeletal: She exhibits edema. She exhibits no tenderness.   Neurological: She is alert and oriented to person, place, and time. No cranial nerve deficit. She exhibits normal muscle tone.   Skin: Skin is warm and dry. She is not diaphoretic. No cyanosis. Nails show no clubbing.   Psychiatric: She has a normal mood and affect. Her behavior is normal.   Nursing note and vitals reviewed.      Recent Labs      03/23/18   2359  03/24/18   0956   WBC  6.8  7.5   RBC  2.57*  3.59*   HEMOGLOBIN  7.6*  10.8*   HEMATOCRIT  21.9*  31.5*   MCV  85.2  87.7   MCH  29.6  30.1   MCHC  34.7  34.3    RDW  43.6  45.1   PLATELETCT  88*  104*   MPV  9.4  10.1     Recent Labs      03/23/18   2359  03/24/18   0956   SODIUM  134*  136   POTASSIUM  3.5*  3.2*   CHLORIDE  107  109   CO2  23  22   GLUCOSE  99  122*   BUN  4*  4*   CREATININE  0.70  0.73   CALCIUM  7.2*  7.6*                      Assessment/Plan     * SIRS (systemic inflammatory response syndrome) (CMS-HCC)- (present on admission)   Assessment & Plan    Patient had fever, chills, and tachycardia  Clinically does not appear toxic or septic at this time  Will discontinue antibiotics  Continue close clinical monitoring        Leukocytosis- (present on admission)   Assessment & Plan    resolved        Postpartum hemorrhage- (present on admission)   Assessment & Plan    s/p emergent hysterectomy  Monitor CBC and clinically  Wound care  I spoke with  since pt is stable and hg improved no further recommendations at this time, she will be available for assistance if needed        Acute blood loss anemia- (present on admission)   Assessment & Plan    S/P 5 units PRBC's and 3 units plasma    Hemoglobin improved  No clinical signs of active bleeding at this time  Continue to monitor        Edema   Assessment & Plan    Likely secondary to fluid resuscitation  Will check lower extremity duplex  Will give one dose of  Lasix    If  CBC remains stable consider starting pharmacologic DVT prophylaxis tomorrow        Thrombocytopenia (CMS-HCC)- (present on admission)   Assessment & Plan    Improved likely  Reactive  Monitor CBC          Quality-Core Measures   Reviewed items::  Labs reviewed, Medications reviewed and Radiology images reviewed  Douglas catheter::  No Douglas  DVT prophylaxis pharmacological::  Contraindicated - High bleeding risk  DVT prophylaxis - mechanical:  SCDs      Plan of care reviewed with patient and discussed with nursing staff  And Dr beebe

## 2018-03-24 NOTE — ASSESSMENT & PLAN NOTE
S/P 5 units PRBC's and 3 units plasma    Hemoglobin overall stable   No clinical signs of active bleeding at this time  Continue to monitor

## 2018-03-24 NOTE — CARE PLAN
Problem: Safety  Goal: Will remain free from injury  Outcome: PROGRESSING AS EXPECTED  Patient remains free from injury

## 2018-03-25 PROBLEM — D72.829 LEUKOCYTOSIS: Status: RESOLVED | Noted: 2018-03-23 | Resolved: 2018-03-25

## 2018-03-25 PROBLEM — K59.00 CONSTIPATION: Status: ACTIVE | Noted: 2018-03-25

## 2018-03-25 PROBLEM — E61.1 IRON DEFICIENCY: Status: ACTIVE | Noted: 2018-03-25

## 2018-03-25 LAB
ALBUMIN SERPL BCP-MCNC: 2.1 G/DL (ref 3.2–4.9)
ALBUMIN/GLOB SERPL: 1 G/DL
ALP SERPL-CCNC: 69 U/L (ref 30–99)
ALT SERPL-CCNC: 12 U/L (ref 2–50)
ANION GAP SERPL CALC-SCNC: 6 MMOL/L (ref 0–11.9)
AST SERPL-CCNC: 16 U/L (ref 12–45)
BASOPHILS # BLD AUTO: 0.1 % (ref 0–1.8)
BASOPHILS # BLD: 0.01 K/UL (ref 0–0.12)
BILIRUB SERPL-MCNC: 0.3 MG/DL (ref 0.1–1.5)
BUN SERPL-MCNC: 5 MG/DL (ref 8–22)
CALCIUM SERPL-MCNC: 7.8 MG/DL (ref 8.5–10.5)
CHLORIDE SERPL-SCNC: 108 MMOL/L (ref 96–112)
CO2 SERPL-SCNC: 22 MMOL/L (ref 20–33)
CREAT SERPL-MCNC: 0.64 MG/DL (ref 0.5–1.4)
EOSINOPHIL # BLD AUTO: 0.09 K/UL (ref 0–0.51)
EOSINOPHIL NFR BLD: 0.9 % (ref 0–6.9)
ERYTHROCYTE [DISTWIDTH] IN BLOOD BY AUTOMATED COUNT: 43.8 FL (ref 35.9–50)
FERRITIN SERPL-MCNC: 126.4 NG/ML (ref 10–291)
GLOBULIN SER CALC-MCNC: 2.2 G/DL (ref 1.9–3.5)
GLUCOSE SERPL-MCNC: 101 MG/DL (ref 65–99)
HCT VFR BLD AUTO: 26.8 % (ref 37–47)
HGB BLD-MCNC: 9.2 G/DL (ref 12–16)
IMM GRANULOCYTES # BLD AUTO: 0.08 K/UL (ref 0–0.11)
IMM GRANULOCYTES NFR BLD AUTO: 0.8 % (ref 0–0.9)
IRON SATN MFR SERPL: 5 % (ref 15–55)
IRON SERPL-MCNC: 16 UG/DL (ref 40–170)
LYMPHOCYTES # BLD AUTO: 1.2 K/UL (ref 1–4.8)
LYMPHOCYTES NFR BLD: 11.5 % (ref 22–41)
MAGNESIUM SERPL-MCNC: 2.1 MG/DL (ref 1.5–2.5)
MCH RBC QN AUTO: 29.6 PG (ref 27–33)
MCHC RBC AUTO-ENTMCNC: 34.3 G/DL (ref 33.6–35)
MCV RBC AUTO: 86.2 FL (ref 81.4–97.8)
MONOCYTES # BLD AUTO: 0.65 K/UL (ref 0–0.85)
MONOCYTES NFR BLD AUTO: 6.2 % (ref 0–13.4)
NEUTROPHILS # BLD AUTO: 8.38 K/UL (ref 2–7.15)
NEUTROPHILS NFR BLD: 80.5 % (ref 44–72)
NRBC # BLD AUTO: 0 K/UL
NRBC BLD-RTO: 0 /100 WBC
PHOSPHATE SERPL-MCNC: 3.6 MG/DL (ref 2.5–4.5)
PLATELET # BLD AUTO: 147 K/UL (ref 164–446)
PMV BLD AUTO: 9.5 FL (ref 9–12.9)
POTASSIUM SERPL-SCNC: 4 MMOL/L (ref 3.6–5.5)
PROT SERPL-MCNC: 4.3 G/DL (ref 6–8.2)
RBC # BLD AUTO: 3.11 M/UL (ref 4.2–5.4)
SODIUM SERPL-SCNC: 136 MMOL/L (ref 135–145)
TIBC SERPL-MCNC: 321 UG/DL (ref 250–450)
WBC # BLD AUTO: 10.4 K/UL (ref 4.8–10.8)

## 2018-03-25 PROCEDURE — 99232 SBSQ HOSP IP/OBS MODERATE 35: CPT | Performed by: HOSPITALIST

## 2018-03-25 PROCEDURE — A9270 NON-COVERED ITEM OR SERVICE: HCPCS | Performed by: HOSPITALIST

## 2018-03-25 PROCEDURE — 700102 HCHG RX REV CODE 250 W/ 637 OVERRIDE(OP): Performed by: HOSPITALIST

## 2018-03-25 PROCEDURE — 83540 ASSAY OF IRON: CPT

## 2018-03-25 PROCEDURE — 700111 HCHG RX REV CODE 636 W/ 250 OVERRIDE (IP): Performed by: HOSPITALIST

## 2018-03-25 PROCEDURE — 85025 COMPLETE CBC W/AUTO DIFF WBC: CPT

## 2018-03-25 PROCEDURE — 84100 ASSAY OF PHOSPHORUS: CPT

## 2018-03-25 PROCEDURE — 83735 ASSAY OF MAGNESIUM: CPT

## 2018-03-25 PROCEDURE — 80053 COMPREHEN METABOLIC PANEL: CPT

## 2018-03-25 PROCEDURE — 83550 IRON BINDING TEST: CPT

## 2018-03-25 PROCEDURE — 770001 HCHG ROOM/CARE - MED/SURG/GYN PRIV*

## 2018-03-25 PROCEDURE — A9270 NON-COVERED ITEM OR SERVICE: HCPCS | Performed by: INTERNAL MEDICINE

## 2018-03-25 PROCEDURE — 700102 HCHG RX REV CODE 250 W/ 637 OVERRIDE(OP): Performed by: INTERNAL MEDICINE

## 2018-03-25 PROCEDURE — 82728 ASSAY OF FERRITIN: CPT

## 2018-03-25 RX ORDER — VITAMIN A ACETATE, BETA CAROTENE, ASCORBIC ACID, CHOLECALCIFEROL, .ALPHA.-TOCOPHEROL ACETATE, DL-, THIAMINE MONONITRATE, RIBOFLAVIN, NIACINAMIDE, PYRIDOXINE HYDROCHLORIDE, FOLIC ACID, CYANOCOBALAMIN, CALCIUM CARBONATE, FERROUS FUMARATE, ZINC OXIDE, CUPRIC OXIDE 3080; 12; 120; 400; 1; 1.84; 3; 20; 22; 920; 25; 200; 27; 10; 2 [IU]/1; UG/1; MG/1; [IU]/1; MG/1; MG/1; MG/1; MG/1; MG/1; [IU]/1; MG/1; MG/1; MG/1; MG/1; MG/1
1 TABLET, FILM COATED ORAL EVERY MORNING
Status: DISCONTINUED | OUTPATIENT
Start: 2018-03-25 | End: 2018-03-26 | Stop reason: HOSPADM

## 2018-03-25 RX ORDER — BISACODYL 10 MG
10 SUPPOSITORY, RECTAL RECTAL
Status: DISCONTINUED | OUTPATIENT
Start: 2018-03-25 | End: 2018-03-26 | Stop reason: HOSPADM

## 2018-03-25 RX ORDER — FERROUS GLUCONATE 324(38)MG
324 TABLET ORAL
Status: DISCONTINUED | OUTPATIENT
Start: 2018-03-25 | End: 2018-03-26 | Stop reason: HOSPADM

## 2018-03-25 RX ORDER — POLYETHYLENE GLYCOL 3350 17 G/17G
1 POWDER, FOR SOLUTION ORAL DAILY
Status: DISCONTINUED | OUTPATIENT
Start: 2018-03-25 | End: 2018-03-26 | Stop reason: HOSPADM

## 2018-03-25 RX ADMIN — Medication 1 TABLET: at 09:37

## 2018-03-25 RX ADMIN — MORPHINE SULFATE 2 MG: 4 INJECTION INTRAVENOUS at 21:39

## 2018-03-25 RX ADMIN — OXYCODONE HYDROCHLORIDE 5 MG: 5 TABLET ORAL at 09:22

## 2018-03-25 RX ADMIN — STANDARDIZED SENNA CONCENTRATE AND DOCUSATE SODIUM 2 TABLET: 8.6; 5 TABLET, FILM COATED ORAL at 19:39

## 2018-03-25 RX ADMIN — OXYCODONE HYDROCHLORIDE 5 MG: 5 TABLET ORAL at 19:39

## 2018-03-25 RX ADMIN — ONDANSETRON 4 MG: 2 INJECTION INTRAMUSCULAR; INTRAVENOUS at 09:22

## 2018-03-25 RX ADMIN — MORPHINE SULFATE 2 MG: 4 INJECTION INTRAVENOUS at 06:03

## 2018-03-25 RX ADMIN — OXYCODONE HYDROCHLORIDE 5 MG: 5 TABLET ORAL at 05:23

## 2018-03-25 RX ADMIN — OXYCODONE HYDROCHLORIDE 5 MG: 5 TABLET ORAL at 12:33

## 2018-03-25 RX ADMIN — MORPHINE SULFATE 2 MG: 4 INJECTION INTRAVENOUS at 15:06

## 2018-03-25 RX ADMIN — ENOXAPARIN SODIUM 40 MG: 100 INJECTION SUBCUTANEOUS at 09:22

## 2018-03-25 RX ADMIN — POLYETHYLENE GLYCOL 3350 1 PACKET: 17 POWDER, FOR SOLUTION ORAL at 09:21

## 2018-03-25 RX ADMIN — STANDARDIZED SENNA CONCENTRATE AND DOCUSATE SODIUM 2 TABLET: 8.6; 5 TABLET, FILM COATED ORAL at 09:22

## 2018-03-25 RX ADMIN — BISACODYL 10 MG: 10 SUPPOSITORY RECTAL at 16:35

## 2018-03-25 RX ADMIN — MORPHINE SULFATE 2 MG: 4 INJECTION INTRAVENOUS at 10:33

## 2018-03-25 RX ADMIN — POLYETHYLENE GLYCOL 3350 1 PACKET: 17 POWDER, FOR SOLUTION ORAL at 16:36

## 2018-03-25 RX ADMIN — BISACODYL 10 MG: 10 SUPPOSITORY RECTAL at 09:22

## 2018-03-25 RX ADMIN — MAGNESIUM HYDROXIDE 30 ML: 400 SUSPENSION ORAL at 09:21

## 2018-03-25 RX ADMIN — FERROUS GLUCONATE 324 MG: 324 TABLET ORAL at 18:13

## 2018-03-25 RX ADMIN — OXYCODONE HYDROCHLORIDE 5 MG: 5 TABLET ORAL at 16:35

## 2018-03-25 ASSESSMENT — PAIN SCALES - GENERAL
PAINLEVEL_OUTOF10: 10
PAINLEVEL_OUTOF10: 3
PAINLEVEL_OUTOF10: 5
PAINLEVEL_OUTOF10: 7
PAINLEVEL_OUTOF10: 3
PAINLEVEL_OUTOF10: 8
PAINLEVEL_OUTOF10: 8
PAINLEVEL_OUTOF10: 2
PAINLEVEL_OUTOF10: 4
PAINLEVEL_OUTOF10: 3
PAINLEVEL_OUTOF10: 3
PAINLEVEL_OUTOF10: 8
PAINLEVEL_OUTOF10: 6
PAINLEVEL_OUTOF10: 8

## 2018-03-25 ASSESSMENT — ENCOUNTER SYMPTOMS
MEMORY LOSS: 0
HALLUCINATIONS: 0
WEAKNESS: 0
NERVOUS/ANXIOUS: 0
ORTHOPNEA: 0
FEVER: 0
BACK PAIN: 0
SPEECH CHANGE: 0
BLOOD IN STOOL: 0
COUGH: 0
EYE REDNESS: 0
CONSTIPATION: 1
SPUTUM PRODUCTION: 0
FALLS: 0
NECK PAIN: 0
FOCAL WEAKNESS: 0
HEADACHES: 0
FLANK PAIN: 0
EYE DISCHARGE: 0
SEIZURES: 0
LOSS OF CONSCIOUSNESS: 0
NAUSEA: 0
PALPITATIONS: 0
DIARRHEA: 0
VOMITING: 0
ABDOMINAL PAIN: 1
WHEEZING: 0
SHORTNESS OF BREATH: 0

## 2018-03-25 ASSESSMENT — LIFESTYLE VARIABLES
SUBSTANCE_ABUSE: 0
ALCOHOL_USE: NO

## 2018-03-25 ASSESSMENT — PATIENT HEALTH QUESTIONNAIRE - PHQ9
SUM OF ALL RESPONSES TO PHQ9 QUESTIONS 1 AND 2: 0
2. FEELING DOWN, DEPRESSED, IRRITABLE, OR HOPELESS: NOT AT ALL
1. LITTLE INTEREST OR PLEASURE IN DOING THINGS: NOT AT ALL

## 2018-03-25 NOTE — PROGRESS NOTES
Pulmonary Critical Care Progress Note        Date of Service: 3/25/2018  Chief Complaint: fevers and chills    History of Present Illness: 17 y.o. female admitted from [facility] for SIRS syndrome and possible severe sepsis.  She underwent vaginal delivery on 3/21 for a full term pregnancy and the baby had respiratory distress with low Apgars and 30 minute resuscitation with transfer to [facility].  The patient 's delivery was complicated by postpartum hemorrhage and suspected uterine rupture requiring hysterectomy.  She did receive 5 units pRBCs and 2 units of FFP.  On the day of transfer, the patient had sudden onset of rigors and fevers with tachycardia.  She was given vanco, gent, and meropenem and transferred to [facility] for higher level of care.    ROS:  C/o severe abdominal cramping and pain.  No nausea/vomiting/diarrhea/fever/chills/dyspnea/CP.  Rash improving.  Still having bilateral foot swelling. Scant vaginal discharge. No vaginal bleeding.    Interval Events:  24 hour interval history reviewed    - no issues overnight   - was able to see her baby   - SR 60-70s   - SBP 120s   - Tmax 98   - still having abdominal pain and worsened   - No BM since 3/21   - Room air   - UOP of 2.8 liters overnight with lasix   - no respiratory issues   - no CXR today   - SCDs   - Hb at 9.2   - platelets at 147    Yesterday's Events:   - transferred from [facility]   - s/p vaginal birth with placenta previa and hemorrhaged with massive transfusion   - no issues overnight   - a/ox4   - slight pain to abdomen   - SR/ST 90-100s   - -120s   - IS 1750cc   - last BM on 3/21   - O2 2 lpm NC   - tolerating regular diet   - Tmax 99.9   - UOP adequate with commode   - NS at 100cc   - having nausea/vomiting this morning   - no CXR today   - meropenem day #2 (has had vanco/gent)   - K at 3.5   - no DVT prophylaxis   - platelets at 88   - Hb of 7.6    PFSH:  No change.  Gen: pleasant/cooperative, speaking in full  sentences, appears stated age, moderate distress related to abdominal pain  Skin: warm/dry, improved faint papular rash across abdomen, no bruising, no petechiae  Ext: FROM noted to RUE, RLE, LLE, LUE without joint effusions or erythema, no popliteal adenopathy, 1+ pedal edema bilat  Neck: supple with FROM, no thyromegaly, no cervical adenopathy (no changes)    Respiratory:     Pulse Oximetry: 97 %    Exam: clear throughout, no chest wall tenderness, no wheezing  ImagingAvailable data reviewed     HemoDynamics:  Pulse: 85, Heart Rate (Monitored): 96  NIBP: 137/87       Exam: RRR, no murmur, 2+ dpp=, 1+ pedal edema, calves nontender, good cap refill  Imaging: Available data reviewed        Neuro:  GCS Total Keny Coma Score: 15       Exam: clear speech, answering all appropriately, symmetrical facial expressions, motor/sensory intact  Imaging: Available data reviewed    Fluids:  Intake/Output       03/23/18 0700 - 03/24/18 0659 03/24/18 0700 - 03/25/18 0659 03/25/18 0700 - 03/26/18 0659      [id number] [id number] Total 1558-1064 7674-6233 Total 9607-0318 7276-1873 Total       Intake    P.O.  --  -- --  360  -- 360  --  -- --    P.O. -- -- -- 360 -- 360 -- -- --    Total Intake -- -- -- 360 -- 360 -- -- --       Output    Urine  --  -- --  2400  400 2800  --  -- --    Number of Times Voided -- 1 x 1 x 2 x 1 x 3 x -- -- --    Void (ml) -- -- -- 2400 400 2800 -- -- --    Total Output -- -- -- 2400 400 2800 -- -- --       Net I/O     -- -- -- -2040 -400 -2440 -- -- --           Recent Labs      03/23/18   7589  03/24/18   0956   SODIUM  134*  136   POTASSIUM  3.5*  3.2*   CHLORIDE  107  109   CO2  23  22   BUN  4*  4*   CREATININE  0.70  0.73   MAGNESIUM   --   1.6   PHOSPHORUS   --   2.8   CALCIUM  7.2*  7.6*       GI/Nutrition:  Exam: mild tenderness suprapubic, no guarding, abdominal incision is clean, dry, and intact, normal active bowel sounds, without masses or organomegaly  Imaging: Available data reviewed  taking  PO  Liver Function  Recent Labs      18   0956   ALTSGPT  14  17   ASTSGOT  24  27   ALKPHOSPHAT  68  91   TBILIRUBIN  0.3  0.3   GLUCOSE  99  122*       Heme:  Recent Labs      18   0956   RBC  2.57*  3.59*   HEMOGLOBIN  7.6*  10.8*   HEMATOCRIT  21.9*  31.5*   PLATELETCT  88*  104*       Infectious Disease:  Temp  Av.8 °C (98.2 °F)  Min: 36.4 °C (97.5 °F)  Max: 37.1 °C (98.7 °F)  Micro: antibiotics reviewed and cultures reviewed  Recent Labs      18   0956   WBC  6.8  7.5   NEUTSPOLYS  75.50*  81.30*   LYMPHOCYTES  17.40*  12.20*   MONOCYTES  5.80  5.60   EOSINOPHILS  0.10  0.10   BASOPHILS  0.30  0.30   ASTSGOT  24  27   ALTSGPT  14  17   ALKPHOSPHAT  68  91   TBILIRUBIN  0.3  0.3     Current Facility-Administered Medications   Medication Dose Frequency Provider Last Rate Last Dose   • potassium chloride SA (Kdur) tablet 40 mEq  40 mEq TID Elian Desouza M.D.   40 mEq at 18   • DULoxetine (CYMBALTA) capsule 60 mg  60 mg DAILY Braxton Dyer M.D.   60 mg at 18   • senna-docusate (PERICOLACE or SENOKOT S) 8.6-50 MG per tablet 2 Tab  2 Tab BID Oleg Kan M.D.   2 Tab at 18    And   • polyethylene glycol/lytes (MIRALAX) PACKET 1 Packet  1 Packet QDAY PRSHINE Kan M.D.   1 Packet at 18    And   • magnesium hydroxide (MILK OF MAGNESIA) suspension 30 mL  30 mL QDAY PRSHINE Kan M.D.   30 mL at 18    And   • bisacodyl (DULCOLAX) suppository 10 mg  10 mg QDAY PRSHINE Kan M.D.       • ondansetron (ZOFRAN) syringe/vial injection 4 mg  4 mg Q4HRS PRN Oleg Kan M.D.   4 mg at 18 1305   • ondansetron (ZOFRAN ODT) dispertab 4 mg  4 mg Q4HRS PRSHINE Kan M.D.       • promethazine (PHENERGAN) tablet 12.5-25 mg  12.5-25 mg Q4HRS PRSHINE Kan M.D.       • promethazine (PHENERGAN) suppository 12.5-25 mg  12.5-25 mg Q4HRS PRSHINE Kan M.D.       •  prochlorperazine (COMPAZINE) injection 5-10 mg  5-10 mg Q4HRS PRSHINE Kan M.D.       • acetaminophen (TYLENOL) tablet 650 mg  650 mg Q6HRS PRSHINE Kan M.D.       • Pharmacy Consult Request ...Pain Management Review   PRN Oleg Kan M.D.        And   • oxyCODONE immediate-release (ROXICODONE) tablet 2.5 mg  2.5 mg Q3HRS PRSHINE Kan M.D.        And   • oxyCODONE immediate-release (ROXICODONE) tablet 5 mg  5 mg Q3HRS PRSHINE Kan M.D.   5 mg at 03/24/18 2300    And   • morphine (pf) 4 mg/ml injection 2 mg  2 mg Q3HRS PRSHINE Kan M.D.   2 mg at 03/24/18 2342     Last reviewed on 7/23/2017  2:24 PM by JOSE EDUARDO Ulloa-DESHAWN    Quality  Measures:  EKG reviewed, Medications reviewed, Labs reviewed and Radiology images reviewed  Douglas catheter: No Douglas      DVT Prophylaxis: Enoxaparin (Lovenox)  DVT prophylaxis - mechanical: SCDs  Ulcer prophylaxis: Not indicated  Antibiotics: Treating active infection/contamination beyond 24 hours perioperative coverage      Problems/Plan:    Acute SIRS syndrome   - fever/chills and leukocytosis have resolved   - rash improving   - s/p meropenem/vanco   - s/p IVF   Acute Rash   - query drug reaction vs transfusion reaction   - improving   - no medications now  Acute Uterine Ruptured   - s/p hysterectomy POD#3   - s/p massive transfusion resuscitation   - hb stable  Acute Blood Loss Anemia   - improving hb   - checking iron studies and treat if low  Postpartum day 3 after vaginal delivery   - transfer to postpartum   - improving hb  Thrombocytopenia   - improving this morning  Acute Peripheral Edema   - LE ultrasound for DVT still pending  Acute Abdominal Pain   - concern for obstipation   - aggressive bowel protocol started   - suppository given this morning  Prophylaxis   - SCDs   - will start lovenox    Pt remains stable for transfer out of the ICU to [facility].  The [facility] intensivist group will sign off upon transfer.    Discussed patient condition and  risk of morbidity and/or mortality with Family, RN, RT, Therapies, Pharmacy, Dietary, , Charge nurse / hot rounds, Patient and hospitalist.    [id number]

## 2018-03-25 NOTE — CONSULTS
Attempted to see mother in her hospital room, RN reports she is in NICU. Spoke with mother in NICU, she does not plan to breast feed or pump breast milk, has been using drying measures, does report breast tenderness/fullness today, is wearing snug bra and using ice packs to discourage milk production. Educated mother on monitoring for signs of infection while drying up milk supply.

## 2018-03-25 NOTE — CARE PLAN
Problem: Bowel/Gastric:  Goal: Normal bowel function is maintained or improved  Will give pt more bowel meds today and attempt to ambulate pt to improve bowel motility    Problem: Pain Management  Goal: Pain level will decrease to patient's comfort goal  Will speak in rounds about current pain regimen

## 2018-03-25 NOTE — PROGRESS NOTES
Report received from DARRIUS RN.  Assessment complete.  A&O x 4. Patient calls appropriately.  Patient up self.   Patient has 4/10 pain. Patient reports intermittent cramping.  Denies N&V. Tolerating regular diet.  Low transverse surgical incision approximated with staples.  + void, + flatus (no BM since admission or surgery. Bowel protocol in place)  Patient denies SOB.    Patient not planning to breast feed or pump. Potential needs (ice packs, elastic bandage, etc.) discussed with patient.     Review plan with of care with patient. Call light and personal belongings with in reach. Hourly rounding in place. All needs met at this time.

## 2018-03-25 NOTE — PROGRESS NOTES
[facility] Hospitalist Progress Note    Date of Service: 3/25/2018    Chief Complaint  17 y.o. female admitted 3/23/2018 with possible sepsis admitted to hospital in [place]  for delivery complicated by significant bleeding requiring hysterectomy. She had fever chills and facial rash and there was concern for possible sepsis she was given broad-spectrum antibiotics and transferred to our facility.    Interval Problem Update  Diuresed well with lasix, edema improved, no BM since 3-21  crampy abd pain    Consultants/Specialty  CC    Disposition  TBD        Review of Systems   Constitutional: Negative for fever and malaise/fatigue.   HENT: Negative.    Eyes: Negative for discharge and redness.   Respiratory: Negative for cough, sputum production, shortness of breath and wheezing.    Cardiovascular: Positive for leg swelling (improved). Negative for chest pain, palpitations and orthopnea.   Gastrointestinal: Positive for abdominal pain and constipation. Negative for blood in stool, diarrhea, melena, nausea and vomiting.   Genitourinary: Negative for dysuria, flank pain, hematuria and urgency.   Musculoskeletal: Negative for back pain, falls, joint pain and neck pain.   Skin: Negative.    Neurological: Negative for speech change, focal weakness, seizures, loss of consciousness, weakness and headaches.   Psychiatric/Behavioral: Negative for hallucinations, memory loss and substance abuse. The patient is not nervous/anxious.    All other systems reviewed and are negative.     Physical Exam  Laboratory/Imaging   Hemodynamics  Temp (24hrs), Av.9 °C (98.4 °F), Min:36.7 °C (98 °F), Max:37.1 °C (98.7 °F)   Temperature: 37 °C (98.6 °F)  Pulse  Av.3  Min: 80  Max: 109    NIBP: 137/87      Respiratory      Respiration: 18, Pulse Oximetry: 97 %             Fluids    Intake/Output Summary (Last 24 hours) at 18 0904  Last data filed at 18 0600   Gross per 24 hour   Intake              100 ml   Output             2800  ml   Net            -2700 ml       Nutrition  Orders Placed This Encounter   Procedures   • Diet Order     Standing Status:   Standing     Number of Occurrences:   1     Order Specific Question:   Diet:     Answer:   Regular [1]     Physical Exam   Constitutional: She is oriented to person, place, and time. She appears well-developed and well-nourished.   HENT:   Head: Normocephalic and atraumatic.   Right Ear: External ear normal.   Left Ear: External ear normal.   Mouth/Throat: No oropharyngeal exudate.   Eyes: Conjunctivae are normal. Pupils are equal, round, and reactive to light. Right eye exhibits no discharge. Left eye exhibits no discharge. No scleral icterus.   Neck: Neck supple. No JVD present. No tracheal deviation present.   Cardiovascular: Normal rate and regular rhythm.  Exam reveals no gallop and no friction rub.    No murmur heard.  Pulmonary/Chest: Effort normal and breath sounds normal. No respiratory distress. She has no wheezes. She has no rales. She exhibits no tenderness.   Abdominal: Soft. Bowel sounds are normal. She exhibits distension. There is tenderness. There is no rebound and no guarding.   Abdominal wall edema improved  Surgical wound with staples no signs of inefction   Musculoskeletal: She exhibits edema. She exhibits no tenderness.   Neurological: She is alert and oriented to person, place, and time. No cranial nerve deficit. She exhibits normal muscle tone.   Skin: Skin is warm and dry. She is not diaphoretic. No cyanosis or erythema. Nails show no clubbing.   Psychiatric: She has a normal mood and affect. Her behavior is normal.   Nursing note and vitals reviewed.      Recent Labs      03/23/18   2359  03/24/18   0956  03/25/18   0518   WBC  6.8  7.5  10.4   RBC  2.57*  3.59*  3.11*   HEMOGLOBIN  7.6*  10.8*  9.2*   HEMATOCRIT  21.9*  31.5*  26.8*   MCV  85.2  87.7  86.2   MCH  29.6  30.1  29.6   MCHC  34.7  34.3  34.3   RDW  43.6  45.1  43.8   PLATELETCT  88*  104*  147*   MPV   9.4  10.1  9.5     Recent Labs      03/23/18   2359  03/24/18   0956  03/25/18   0518   SODIUM  134*  136  136   POTASSIUM  3.5*  3.2*  4.0   CHLORIDE  107  109  108   CO2  23  22  22   GLUCOSE  99  122*  101*   BUN  4*  4*  5*   CREATININE  0.70  0.73  0.64   CALCIUM  7.2*  7.6*  7.8*                      Assessment/Plan     * SIRS (systemic inflammatory response syndrome) (CMS-HCC)- (present on admission)   Assessment & Plan    Resolved  Stable off abx        Postpartum hemorrhage- (present on admission)   Assessment & Plan    s/p emergent hysterectomy  No signs of active bleed at this time  Check iron levels and replete accordingly  I spoke with   On 3-24 since pt is stable and hg improved no further recommendationsfrom OB  Reviewed CT report from outlying facility clots noted but no active bleeding    Patient counseled on benefits of breast-feeding she is not interested in breast-feeding at this time        Acute blood loss anemia- (present on admission)   Assessment & Plan    S/P 5 units PRBC's and 3 units plasma    Hemoglobin overall stable   No clinical signs of active bleeding at this time  Continue to monitor        Iron deficiency   Assessment & Plan    Start oral supplements        Constipation   Assessment & Plan    Bowel protocol        Edema   Assessment & Plan    Likely secondary to fluid resuscitation  F/u on  lower extremity duplex  Responded to lasix          Thrombocytopenia (CMS-HCC)- (present on admission)   Assessment & Plan    Improving plts 147   likely  Reactive  Continue to monitor          Quality-Core Measures   Reviewed items::  Labs reviewed, Medications reviewed and Radiology images reviewed  Douglas catheter::  No Douglas  DVT prophylaxis pharmacological::  Enoxaparin (Lovenox)  DVT prophylaxis - mechanical:  SCDs      Plan of care reviewed with patient and discussed with nursing staff  And Dr beebe

## 2018-03-25 NOTE — CARE PLAN
Problem: Communication  Goal: The ability to communicate needs accurately and effectively will improve  Outcome: PROGRESSING SLOWER THAN EXPECTED      Problem: Pain Management  Goal: Pain level will decrease to patient's comfort goal  Outcome: PROGRESSING SLOWER THAN EXPECTED

## 2018-03-26 VITALS
TEMPERATURE: 98 F | HEART RATE: 89 BPM | DIASTOLIC BLOOD PRESSURE: 77 MMHG | RESPIRATION RATE: 16 BRPM | OXYGEN SATURATION: 96 % | WEIGHT: 180.78 LBS | SYSTOLIC BLOOD PRESSURE: 110 MMHG | HEIGHT: 65 IN | BODY MASS INDEX: 30.12 KG/M2

## 2018-03-26 LAB
ANION GAP SERPL CALC-SCNC: 4 MMOL/L (ref 0–11.9)
BASOPHILS # BLD AUTO: 0.4 % (ref 0–1.8)
BASOPHILS # BLD: 0.04 K/UL (ref 0–0.12)
BUN SERPL-MCNC: 5 MG/DL (ref 8–22)
CALCIUM SERPL-MCNC: 7.9 MG/DL (ref 8.5–10.5)
CHLORIDE SERPL-SCNC: 106 MMOL/L (ref 96–112)
CO2 SERPL-SCNC: 26 MMOL/L (ref 20–33)
CREAT SERPL-MCNC: 0.61 MG/DL (ref 0.5–1.4)
EOSINOPHIL # BLD AUTO: 0.13 K/UL (ref 0–0.51)
EOSINOPHIL NFR BLD: 1.4 % (ref 0–6.9)
ERYTHROCYTE [DISTWIDTH] IN BLOOD BY AUTOMATED COUNT: 44.4 FL (ref 35.9–50)
GLUCOSE SERPL-MCNC: 83 MG/DL (ref 65–99)
HCT VFR BLD AUTO: 28.3 % (ref 37–47)
HGB BLD-MCNC: 9.4 G/DL (ref 12–16)
IMM GRANULOCYTES # BLD AUTO: 0.06 K/UL (ref 0–0.11)
IMM GRANULOCYTES NFR BLD AUTO: 0.6 % (ref 0–0.9)
LYMPHOCYTES # BLD AUTO: 2.04 K/UL (ref 1–4.8)
LYMPHOCYTES NFR BLD: 22.1 % (ref 22–41)
MCH RBC QN AUTO: 29.2 PG (ref 27–33)
MCHC RBC AUTO-ENTMCNC: 33.2 G/DL (ref 33.6–35)
MCV RBC AUTO: 87.9 FL (ref 81.4–97.8)
MONOCYTES # BLD AUTO: 0.6 K/UL (ref 0–0.85)
MONOCYTES NFR BLD AUTO: 6.5 % (ref 0–13.4)
NEUTROPHILS # BLD AUTO: 6.37 K/UL (ref 2–7.15)
NEUTROPHILS NFR BLD: 69 % (ref 44–72)
NRBC # BLD AUTO: 0 K/UL
NRBC BLD-RTO: 0 /100 WBC
PLATELET # BLD AUTO: 190 K/UL (ref 164–446)
PMV BLD AUTO: 9.8 FL (ref 9–12.9)
POTASSIUM SERPL-SCNC: 4.4 MMOL/L (ref 3.6–5.5)
RBC # BLD AUTO: 3.22 M/UL (ref 4.2–5.4)
SODIUM SERPL-SCNC: 136 MMOL/L (ref 135–145)
WBC # BLD AUTO: 9.2 K/UL (ref 4.8–10.8)

## 2018-03-26 PROCEDURE — 700102 HCHG RX REV CODE 250 W/ 637 OVERRIDE(OP): Performed by: INTERNAL MEDICINE

## 2018-03-26 PROCEDURE — 85025 COMPLETE CBC W/AUTO DIFF WBC: CPT

## 2018-03-26 PROCEDURE — 700102 HCHG RX REV CODE 250 W/ 637 OVERRIDE(OP): Performed by: HOSPITALIST

## 2018-03-26 PROCEDURE — 99239 HOSP IP/OBS DSCHRG MGMT >30: CPT | Performed by: FAMILY MEDICINE

## 2018-03-26 PROCEDURE — A9270 NON-COVERED ITEM OR SERVICE: HCPCS | Performed by: HOSPITALIST

## 2018-03-26 PROCEDURE — 80048 BASIC METABOLIC PNL TOTAL CA: CPT

## 2018-03-26 PROCEDURE — A9270 NON-COVERED ITEM OR SERVICE: HCPCS | Performed by: INTERNAL MEDICINE

## 2018-03-26 PROCEDURE — 700111 HCHG RX REV CODE 636 W/ 250 OVERRIDE (IP): Performed by: HOSPITALIST

## 2018-03-26 PROCEDURE — 36415 COLL VENOUS BLD VENIPUNCTURE: CPT

## 2018-03-26 RX ORDER — OXYCODONE HYDROCHLORIDE 5 MG/1
5 TABLET ORAL EVERY 4 HOURS PRN
Qty: 30 TAB | Refills: 0 | Status: SHIPPED | OUTPATIENT
Start: 2018-03-26 | End: 2018-03-31

## 2018-03-26 RX ORDER — FERROUS GLUCONATE 324(38)MG
324 TABLET ORAL 2 TIMES DAILY WITH MEALS
Qty: 30 TAB | Refills: 1 | Status: SHIPPED | OUTPATIENT
Start: 2018-03-26 | End: 2018-05-31

## 2018-03-26 RX ORDER — POLYETHYLENE GLYCOL 3350 17 G/17G
17 POWDER, FOR SOLUTION ORAL DAILY
Qty: 30 EACH | Refills: 1 | Status: SHIPPED | OUTPATIENT
Start: 2018-03-27 | End: 2018-05-31

## 2018-03-26 RX ORDER — BISACODYL 10 MG
10 SUPPOSITORY, RECTAL RECTAL
Qty: 10 SUPPOSITORY | Refills: 0 | Status: SHIPPED | OUTPATIENT
Start: 2018-03-26 | End: 2018-05-31

## 2018-03-26 RX ORDER — VITAMIN A ACETATE, BETA CAROTENE, ASCORBIC ACID, CHOLECALCIFEROL, .ALPHA.-TOCOPHEROL ACETATE, DL-, THIAMINE MONONITRATE, RIBOFLAVIN, NIACINAMIDE, PYRIDOXINE HYDROCHLORIDE, FOLIC ACID, CYANOCOBALAMIN, CALCIUM CARBONATE, FERROUS FUMARATE, ZINC OXIDE, CUPRIC OXIDE 3080; 12; 120; 400; 1; 1.84; 3; 20; 22; 920; 25; 200; 27; 10; 2 [IU]/1; UG/1; MG/1; [IU]/1; MG/1; MG/1; MG/1; MG/1; MG/1; [IU]/1; MG/1; MG/1; MG/1; MG/1; MG/1
1 TABLET, FILM COATED ORAL EVERY MORNING
Qty: 30 TAB | Refills: 1 | Status: SHIPPED | OUTPATIENT
Start: 2018-03-27 | End: 2018-05-31

## 2018-03-26 RX ADMIN — MORPHINE SULFATE 2 MG: 4 INJECTION INTRAVENOUS at 06:29

## 2018-03-26 RX ADMIN — DULOXETINE HYDROCHLORIDE 60 MG: 60 CAPSULE, DELAYED RELEASE ORAL at 08:42

## 2018-03-26 RX ADMIN — Medication 1 TABLET: at 08:42

## 2018-03-26 RX ADMIN — POLYETHYLENE GLYCOL 3350 1 PACKET: 17 POWDER, FOR SOLUTION ORAL at 08:42

## 2018-03-26 RX ADMIN — STANDARDIZED SENNA CONCENTRATE AND DOCUSATE SODIUM 2 TABLET: 8.6; 5 TABLET, FILM COATED ORAL at 08:42

## 2018-03-26 ASSESSMENT — PAIN SCALES - GENERAL: PAINLEVEL_OUTOF10: 3

## 2018-03-26 NOTE — PROGRESS NOTES
"/69   Pulse 80   Temp 36.4 °C (97.6 °F)   Resp 17   Ht 1.651 m (5' 5\")   Wt 82 kg (180 lb 12.4 oz)   SpO2 94%   Breastfeeding? Unknown Comment: Patient gave birth within the last week  BMI 30.08 kg/m²     Patient is A&Ox4.   Reports abdominal cramping, medicated per SRIDEVI BURTON, CMS intact, denies numbness and tingling. Mild edema to BLE, MICHELLE hose in place.   Mobilizes independently, educated to call for assistance.   On RA, denies SOB, chest pain.   Hypoactive BS x 4. Denies N&V. Poor appetite.   + flatus, + BM. Pt is voiding. Mild bleeding noted.   Low transverse incision noted, approximated with staples, lindsey.   PIV SL.   Updated on POC. Belongings and call light in reach. All needs met at this time.    at bedside.   "

## 2018-03-26 NOTE — CARE PLAN
Problem: Knowledge Deficit  Goal: Knowledge of disease process/condition, treatment plan, diagnostic tests, and medications will improve  Outcome: PROGRESSING AS EXPECTED  Rn discussed plan of care with patient.

## 2018-03-26 NOTE — DISCHARGE INSTRUCTIONS
Discharge Instructions      Discharged to home by car with relative. Discharged via wheelchair, hospital escort: Yes.  Special equipment needed: Not Applicable    Be sure to schedule a follow-up appointment with your primary care doctor or any specialists as instructed.     Discharge Plan:   Diet Plan: Discussed  Activity Level: Discussed  Confirmed Follow up Appointment: Patient to Call and Schedule Appointment  Confirmed Symptoms Management: Discussed  Medication Reconciliation Updated: Yes  Influenza Vaccine Indication: Not indicated: Previously immunized this influenza season and > 8 years of age (October 2017)    I understand that a diet low in cholesterol, fat, and sodium is recommended for good health. Unless I have been given specific instructions below for another diet, I accept this instruction as my diet prescription.   Other diet: Regular, as tolerated    Special Instructions: Sepsis, Adult  Sepsis is a serious infection of your blood or tissues that affects your whole body. The infection that causes sepsis may be bacterial, viral, fungal, or parasitic. Sepsis may be life threatening. Sepsis can cause your blood pressure to drop. This may result in shock. Shock causes your central nervous system and your organs to stop working correctly.  What increases the risk?  Sepsis can happen in anyone, but it is more likely to happen in people who have weakened immune systems.  What are the signs or symptoms?  Symptoms of sepsis can include:  · Fever or low body temperature (hypothermia).  · Rapid breathing (hyperventilation).  · Chills.  · Rapid heartbeat (tachycardia).  · Confusion or light-headedness.  · Trouble breathing.  · Urinating much less than usual.  · Cool, clammy skin or red, flushed skin.  · Other problems with the heart, kidneys, or brain.  How is this diagnosed?  Your health care provider will likely do tests to look for an infection, to see if the infection has spread to your blood, and to see how  serious your condition is. Tests can include:  · Blood tests, including cultures of your blood.  · Cultures of other fluids from your body, such as:  ¨ Urine.  ¨ Pus from wounds.  ¨ Mucus coughed up from your lungs.  · Urine tests other than cultures.  · X-ray exams or other imaging tests.  How is this treated?  Treatment will begin with elimination of the source of infection. If your sepsis is likely caused by a bacterial or fungal infection, you will be given antibiotic or antifungal medicines.  You may also receive:  · Oxygen.  · Fluids through an IV tube.  · Medicines to increase your blood pressure.  · A machine to clean your blood (dialysis) if your kidneys fail.  · A machine to help you breathe if your lungs fail.  Get help right away if:  You get an infection or develop any of the signs and symptoms of sepsis after surgery or a hospitalization.  This information is not intended to replace advice given to you by your health care provider. Make sure you discuss any questions you have with your health care provider.  Document Released: 09/15/2004 Document Revised: 05/25/2017 Document Reviewed: 08/25/2014  [employer] Interactive Patient Education © 2017 [employer]  ·         · Is patient discharged on Warfarin / Coumadin?   No     Depression / Suicide Risk    As you are discharged from this [facility] facility, it is important to learn how to keep safe from harming yourself.    Recognize the warning signs:  · Abrupt changes in personality, positive or negative- including increase in energy   · Giving away possessions  · Change in eating patterns- significant weight changes-  positive or negative  · Change in sleeping patterns- unable to sleep or sleeping all the time   · Unwillingness or inability to communicate  · Depression  · Unusual sadness, discouragement and loneliness  · Talk of wanting to die  · Neglect of personal appearance   · Rebelliousness- reckless behavior  · Withdrawal from people/activities  they love  · Confusion- inability to concentrate     If you or a loved one observes any of these behaviors or has concerns about self-harm, here's what you can do:  · Talk about it- your feelings and reasons for harming yourself  · Remove any means that you might use to hurt yourself (examples: pills, rope, extension cords, firearm)  · Get professional help from the community (Mental Health, Substance Abuse, psychological counseling)  · Do not be alone:Call your Safe Contact- someone whom you trust who will be there for you.  · Call your local CRISIS HOTLINE 111-2719 or 653-075-8970  · Call your local Children's Mobile Crisis Response Team [place] (096) 040-9945 or www.SQMOS  · Call the toll free National Suicide Prevention Hotlines   · National Suicide Prevention Lifeline 418-440-NVEN (1371)  · [employer] Line Network 800-SUICIDE (708-6729)      Dilation and Curettage or Vacuum Curettage  Dilation and curettage (D&C) and vacuum curettage are minor procedures. A D&C involves stretching (dilation) the cervix and scraping (curettage) the inside lining of the uterus (endometrium). During a D&C, tissue is gently scraped from the endometrium, starting from the top portion of the uterus down to the lowest part of the uterus (cervix). During a vacuum curettage, the lining and tissue in the uterus are removed with the use of gentle suction.  Curettage may be performed to either diagnose or treat a problem. As a diagnostic procedure, curettage is performed to examine tissues from the uterus. A diagnostic curettage may be done if you have:  · Irregular bleeding in the uterus.  · Bleeding with the development of clots.  · Spotting between menstrual periods.  · Prolonged menstrual periods or other abnormal bleeding.  · Bleeding after menopause.  · No menstrual period (amenorrhea).  · A change in size and shape of the uterus.  · Abnormal endometrial cells discovered during a Pap test.  As a treatment  procedure, curettage may be performed for the following reasons:  · Removal of an IUD (intrauterine device).  · Removal of retained placenta after giving birth.  · .  · Miscarriage.  · Removal of endometrial polyps.  · Removal of uncommon types of noncancerous lumps (fibroids).  Tell a health care provider about:  · Any allergies you have, including allergies to prescribed medicine or latex.  · All medicines you are taking, including vitamins, herbs, eye drops, creams, and over-the-counter medicines. This is especially important if you take any blood-thinning medicine. Bring a list of all of your medicines to your appointment.  · Any problems you or family members have had with anesthetic medicines.  · Any blood disorders you have.  · Any surgeries you have had.  · Your medical history and any medical conditions you have.  · Whether you are pregnant or may be pregnant.  · Recent vaginal infections you have had.  · Recent menstrual periods, bleeding problems you have had, and what form of birth control (contraception) you use.  What are the risks?  Generally, this is a safe procedure. However, problems may occur, including:  · Infection.  · Heavy vaginal bleeding.  · Allergic reactions to medicines.  · Damage to the cervix or other structures or organs.  · Development of scar tissue (adhesions) inside the uterus, which can cause abnormal amounts of menstrual bleeding. This may make it harder to get pregnant in the future.  · A hole (perforation) or puncture in the uterine wall. This is rare.  What happens before the procedure?  Staying hydrated   Follow instructions from your health care provider about hydration, which may include:  · Up to 2 hours before the procedure - you may continue to drink clear liquids, such as water, clear fruit juice, black coffee, and plain tea.  Eating and drinking restrictions   Follow instructions from your health care provider about eating and drinking, which may include:  · 8  hours before the procedure - stop eating heavy meals or foods such as meat, fried foods, or fatty foods.  · 6 hours before the procedure - stop eating light meals or foods, such as toast or cereal.  · 6 hours before the procedure - stop drinking milk or drinks that contain milk.  · 2 hours before the procedure - stop drinking clear liquids. If your health care provider told you to take your medicine(s) on the day of your procedure, take them with only a sip of water.  Medicines  · Ask your health care provider about:  ¨ Changing or stopping your regular medicines. This is especially important if you are taking diabetes medicines or blood thinners.  ¨ Taking medicines such as aspirin and ibuprofen. These medicines can thin your blood. Do not take these medicines before your procedure if your health care provider instructs you not to.  · You may be given antibiotic medicine to help prevent infection.  General instructions  · For 24 hours before your procedure, do not:  ¨ Douche.  ¨ Use tampons.  ¨ Use medicines, creams, or suppositories in the vagina.  ¨ Have sexual intercourse.  · You may be given a pregnancy test on the day of the procedure.  · Plan to have someone take you home from the hospital or clinic.  · You may have a blood or urine sample taken.  · If you will be going home right after the procedure, plan to have someone with you for 24 hours.  What happens during the procedure?  · To reduce your risk of infection:  ¨ Your health care team will wash or sanitize their hands.  ¨ Your skin will be washed with soap.  · An IV tube will be inserted into one of your veins.  · You will be given one of the following:  ¨ A medicine that numbs the area in and around the cervix (local anesthetic).  ¨ A medicine to make you fall asleep (general anesthetic).  · You will lie down on your back, with your feet in foot rests (stirrups).  · The size and position of your uterus will be checked.  · A lubricated instrument  (speculum or Culver retractor) will be inserted into the back side of your vagina. The speculum will be used to hold apart the walls of your vagina so your health care provider can see your cervix.  · A tool (tenaculum) will be attached to the lip of the cervix to stabilize it.  · Your cervix will be softened and dilated. This may be done by:  ¨ Taking a medicine.  ¨ Having tapered dilators or thin rods (laminaria) or gradual widening instruments (tapered dilators) inserted into your cervix.  · A small, sharp, curved instrument (curette) will be used to scrape a small amount of tissue or cells from the endometrium or cervical canal. In some cases, gentle suction is applied with the curette. The curette will then be removed. The cells will be taken to a lab for testing.  The procedure may vary among health care providers and hospitals.  What happens after the procedure?  · You may have mild cramping, backache, pain, and light bleeding or spotting. You may pass small blood clots from your vagina.  · You may have to wear compression stockings. These stockings help to prevent blood clots and reduce swelling in your legs.  · Your blood pressure, heart rate, breathing rate, and blood oxygen level will be monitored until the medicines you were given have worn off.  Summary  · Dilation and curettage (D&C) involves stretching (dilation) the cervix and scraping (curettage) the inside lining of the uterus (endometrium).  · After the procedure, you may have mild cramping, backache, pain, and light bleeding or spotting. You may pass small blood clots from your vagina.  · Plan to have someone take you home from the hospital or clinic.  This information is not intended to replace advice given to you by your health care provider. Make sure you discuss any questions you have with your health care provider.  Document Released: 12/18/2006 Document Revised: 09/03/2017 Document Reviewed: 09/03/2017  [employer] Patient Education  © 2017 [employer]      Hysterectomy Information  A hysterectomy is a surgery to remove your uterus. After surgery, you will no longer have periods. Also, you will not be able to get pregnant.  Reasons for this surgery  · You have bleeding that is not normal and keeps coming back.  · You have lasting (chronic) lower belly (pelvic) pain.  · You have a lasting infection.  · The lining of your uterus grows outside your uterus.  · The lining of your uterus grows in the muscle of your uterus.  · Your uterus falls down into your vagina.  · You have a growth in your uterus that causes problems.  · You have cells that could turn into cancer (precancerous cells).  · You have cancer of the uterus or cervix.  Types  There are 3 types of hysterectomies. Depending on the type, the surgery will:  · Remove the top part of the uterus only.  · Remove the uterus and the cervix.  · Remove the uterus, cervix, and tissue that holds the uterus in place in the lower belly.  Ways a hysterectomy can be performed  There are 5 ways this surgery can be performed.  · A cut (incision) is made in the belly (abdomen). The uterus is taken out through the cut.  · A cut is made in the vagina. The uterus is taken out through the cut.  · Three or four cuts are made in the belly. A surgical device with a camera is put through one of the cuts. The uterus is cut into small pieces. The uterus is taken out through the cuts or the vagina.  · Three or four cuts are made in the belly. A surgical device with a camera is put through one of the cuts. The uterus is taken out through the vagina.  · Three or four cuts are made in the belly. A surgical device that is controlled by a computer makes a visual image. The device helps the surgeon control the surgical tools. The uterus is cut into small pieces. The pieces are taken out through the cuts or through the vagina.  What can I expect after the surgery?  · You will be given pain medicine.  · You will need help  at home for 3-5 days after surgery.  · You will need to see your doctor in 2-4 weeks after surgery.  · You may get hot flashes, have night sweats, and have trouble sleeping.  · You may need to have Pap tests in the future if your surgery was related to cancer. Talk to your doctor. It is still good to have regular exams.  This information is not intended to replace advice given to you by your health care provider. Make sure you discuss any questions you have with your health care provider.  Document Released: 03/11/2013 Document Revised: 05/25/2017 Document Reviewed: 08/25/2014  [employer] Interactive Patient Education © 2017 [employer]

## 2018-03-26 NOTE — PROGRESS NOTES
Report received, poc discussed, assumed care of pt.   Call light in reach, hourly rounding in place.   Pt gets up self.   Reg diet.  + void. LBM 3/25, + flatus.   Oxy and morphine for pain.  No further needs.  SO at bedside. Visiting baby during the daytime.

## 2018-03-26 NOTE — DISCHARGE SUMMARY
Hospital Medicine Discharge Note     Admit Date:  3/23/2018       Discharge Date:   3/26/2018    Attending Physician:  Dru Keane     Diagnoses (includes active and resolved):   Principal Problem:    SIRS (systemic inflammatory response syndrome) (CMS-[facility]) POA: Yes  Active Problems:    Acute blood loss anemia POA: Yes    Postpartum hemorrhage POA: Yes    Thrombocytopenia (CMS-[facility]) POA: Yes    Edema POA: Unknown    Constipation POA: Unknown    Iron deficiency POA: Unknown  Resolved Problems:    Leukocytosis POA: Yes      Hospital Summary (Brief Narrative):       Patient was transferred here from outside facility for acute blood loss anemia, postpartum hemorrhage, possible sepsis. Patient had a vaginal delivery on 3/21/2018, apparently had uncontrolled hemorrhage and bleeding, requiring transfusions, requiring a hysterectomy. Postoperatively she had fever and elevated white count. There was concern for sepsis, IV vancomycin and meropenem.She was then transferred over here.Antibiotics were stopped here after a day. Her white count was trended down she is afebrile. Her vaginal bleeding at this point is controlled she only has spotting.        Consultants:      Critical care    Procedures:        None    Discharge Medications:        Medication Reconciliation Completed     Medication List      START taking these medications      Instructions   bisacodyl 10 MG Supp  Commonly known as:  DULCOLAX   Insert 1 Suppository in rectum 1 time daily as needed.  Dose:  10 mg     ferrous gluconate 324 (38 Fe) MG Tabs  Commonly known as:  FERGON   Take 1 Tab by mouth 2 times a day, with meals.  Dose:  324 mg     oxyCODONE immediate-release 5 MG Tabs  Commonly known as:  ROXICODONE   Take 1 Tab by mouth every four hours as needed for up to 5 days.  Dose:  5 mg     polyethylene glycol/lytes Pack  Start taking on:  3/27/2018  Commonly known as:  MIRALAX   Take 1 Packet by mouth every day.  Dose:  17 g     prenatal plus vitamin 27-1  MG Tabs tablet  Start taking on:  3/27/2018   Take 1 Tab by mouth every morning.  Dose:  1 Tab        CONTINUE taking these medications      Instructions   DULOXETINE HCL PO   Take  by mouth.        STOP taking these medications    fluticasone 50 MCG/ACT nasal spray  Commonly known as:  FLONASE     guaifenesin-codeine Soln oral solution  Commonly known as:  ROBITUSSIN AC     MethylPREDNISolone 4 MG Tbpk  Commonly known as:  MEDROL DOSEPAK     naproxen 375 MG Tabs  Commonly known as:  NAPROSYN     phenazopyridine 200 MG Tabs  Commonly known as:  PYRIDIUM     ROBITUSSIN 12 HOUR COUGH 30 MG/5ML Suer  Generic drug:  Dextromethorphan Polistirex ER     TRI-SPRINTEC 0.18/0.215/0.25 MG-35 MCG Tabs  Generic drug:  Norgestim-Eth Estrad Triphasic              Disposition:  Home    Diet:   Regular    Activity:   As tolerated    Code status:   Full     Primary Care Provider:    Lm Vivar M.D.    Follow up appointment details :        Lm Vivar M.D.  [address] [place] [id number]  [phone]    In 1 week      No future appointments.    Pending Studies:        None    Time spent on discharge day patient visit: 40 minutes    #################################################      Most Recent Labs:    Lab Results   Component Value Date/Time    WBC 9.2 03/26/2018 02:48 AM    RBC 3.22 (L) 03/26/2018 02:48 AM    HEMOGLOBIN 9.4 (L) 03/26/2018 02:48 AM    HEMATOCRIT 28.3 (L) 03/26/2018 02:48 AM    MCV 87.9 03/26/2018 02:48 AM    MCH 29.2 03/26/2018 02:48 AM    MCHC 33.2 (L) 03/26/2018 02:48 AM    MPV 9.8 03/26/2018 02:48 AM    NEUTSPOLYS 69.00 03/26/2018 02:48 AM    LYMPHOCYTES 22.10 03/26/2018 02:48 AM    MONOCYTES 6.50 03/26/2018 02:48 AM    EOSINOPHILS 1.40 03/26/2018 02:48 AM    BASOPHILS 0.40 03/26/2018 02:48 AM    HYPOCHROMIA 1+ 11/19/2010 07:48 AM      Lab Results   Component Value Date/Time    SODIUM 136 03/26/2018 02:49 AM    POTASSIUM 4.4 03/26/2018 02:49 AM    CHLORIDE 106 03/26/2018 02:49 AM    CO2 26  03/26/2018 02:49 AM    GLUCOSE 83 03/26/2018 02:49 AM    BUN 5 (L) 03/26/2018 02:49 AM    CREATININE 0.61 03/26/2018 02:49 AM      Lab Results   Component Value Date/Time    ALTSGPT 12 03/25/2018 05:18 AM    ASTSGOT 16 03/25/2018 05:18 AM    ALKPHOSPHAT 69 03/25/2018 05:18 AM    TBILIRUBIN 0.3 03/25/2018 05:18 AM    ALBUMIN 2.1 (L) 03/25/2018 05:18 AM    GLOBULIN 2.2 03/25/2018 05:18 AM     No results found for: PROTHROMBTM, INR     Imaging/ Testing:      No orders to display       Instructions:      The patient was instructed to return to the ER in the event of worsening symptoms. I have counseled the patient on the importance of compliance and the patient has agreed to proceed with all medical recommendations and follow up plan indicated above.   The patient understands that all medications come with benefits and risks. Risks may include permanent injury or death and these risks can be minimized with close reassessment and monitoring.

## 2018-03-26 NOTE — CARE PLAN
Problem: Pain Management  Goal: Pain level will decrease to patient's comfort goal  Outcome: PROGRESSING AS EXPECTED  Pain managed with PRN oxycodone and Morphine. Educated pt on using Morphine PRN only for breakthrough pain and not as primary medication for pain control. Needs re-education.

## 2018-03-26 NOTE — CARE PLAN
Problem: Safety  Goal: Will remain free from injury  Outcome: PROGRESSING AS EXPECTED  Safety precautions in place. Bed in locked/low position. 2 side rails up. Treaded socks. Call light in reach, calls appropriately. Hourly rounding practiced.

## 2018-03-26 NOTE — PROGRESS NOTES
Discharge ordered. Paperwork completed and explained to pt. Went over medications. Prescriptions written and given to pt. Appt to be scheduled by pt. Follow up information provided.  Pt verbalized understanding of instructions and had no further questions. Pt left unit with mom in . Pt has all belongings.

## 2018-03-26 NOTE — CARE PLAN
Problem: Safety  Goal: Will remain free from falls  Outcome: PROGRESSING AS EXPECTED  Patient remains free from falls at this time.   Call light in reach   Personal belongings in reach  Family at bedside

## 2018-05-31 ENCOUNTER — OFFICE VISIT (OUTPATIENT)
Dept: URGENT CARE | Facility: PHYSICIAN GROUP | Age: 18
End: 2018-05-31
Payer: MEDICAID

## 2018-05-31 VITALS
OXYGEN SATURATION: 99 % | BODY MASS INDEX: 24.27 KG/M2 | HEIGHT: 66 IN | SYSTOLIC BLOOD PRESSURE: 110 MMHG | TEMPERATURE: 98.5 F | HEART RATE: 110 BPM | RESPIRATION RATE: 16 BRPM | DIASTOLIC BLOOD PRESSURE: 70 MMHG | WEIGHT: 151 LBS

## 2018-05-31 DIAGNOSIS — M75.81 ROTATOR CUFF TENDINITIS, RIGHT: ICD-10-CM

## 2018-05-31 PROCEDURE — 99214 OFFICE O/P EST MOD 30 MIN: CPT | Performed by: PHYSICIAN ASSISTANT

## 2018-05-31 RX ORDER — TRAMADOL HYDROCHLORIDE 50 MG/1
TABLET ORAL
Qty: 10 TAB | Refills: 0 | Status: SHIPPED | OUTPATIENT
Start: 2018-05-31 | End: 2018-06-10

## 2018-05-31 RX ORDER — KETOROLAC TROMETHAMINE 30 MG/ML
60 INJECTION, SOLUTION INTRAMUSCULAR; INTRAVENOUS ONCE
Status: COMPLETED | OUTPATIENT
Start: 2018-05-31 | End: 2018-05-31

## 2018-05-31 RX ADMIN — KETOROLAC TROMETHAMINE 60 MG: 30 INJECTION, SOLUTION INTRAMUSCULAR; INTRAVENOUS at 14:00

## 2018-05-31 ASSESSMENT — ENCOUNTER SYMPTOMS
RESPIRATORY NEGATIVE: 1
CONSTITUTIONAL NEGATIVE: 1
NECK PAIN: 0
CARDIOVASCULAR NEGATIVE: 1
NEUROLOGICAL NEGATIVE: 1

## 2018-05-31 NOTE — PROGRESS NOTES
Subjective:      Raisa Merlos is a 18 y.o. female who presents with Shoulder Pain (Right;)      Shoulder Pain   Pertinent negatives include no neck pain.   Patient presents for about 48 hours of right shoulder pain.  Not getting better or worse.  Woke up with it and thought she slept on it wrong but did not get better.  Cannot think of any injury or any overuse other than her picking up her 2 month old infant frequently.  She does have hx of rotator cuff tear and collar bone injury two years ago that she guesses just healed eventually as it did not cause her issues recently.   She denies numbness, tingling or weakness of the RUE.  She applied Tiger Balm which did not help however no other interventions.     Review of Systems   Constitutional: Negative.    Respiratory: Negative.    Cardiovascular: Negative.    Musculoskeletal: Negative for neck pain.        SEE HPI   Skin: Negative.    Neurological: Negative.    Endo/Heme/Allergies: Negative.        PMH:  has a past medical history of Allergy; Asthma, exercise induced; and FHx: cancer.  MEDS:   Current Outpatient Prescriptions:   •  tramadol (ULTRAM) 50 MG Tab, 1 tablet at bedtime prn severe pain up to 10 days, Disp: 10 Tab, Rfl: 0  •  DULOXETINE HCL PO, Take  by mouth., Disp: , Rfl:     Current Facility-Administered Medications:   •  ketorolac (TORADOL) injection 60 mg, 60 mg, Intramuscular, Once, Jaquelin Copeland, P.A.-C.  ALLERGIES:   Allergies   Allergen Reactions   • Clindamycin      Severe stomach pains     • Pcn [Penicillins] Vomiting     SEVERE VOMITING   • Suprax [Cefixime] Rash and Vomiting     SURGHX:   Past Surgical History:   Procedure Laterality Date   • FINGER ORIF  11/19/2010    Performed by WANG ROGERS at SURGERY SAME DAY [facility]     SOCHX:  reports that she has never smoked. She has never used smokeless tobacco. She reports that she does not drink alcohol or use drugs.  FH: Family history was reviewed, no pertinent findings to  "report   Objective:     /70   Pulse (!) 110   Temp 36.9 °C (98.5 °F)   Resp 16   Ht 1.676 m (5' 6\")   Wt 68.5 kg (151 lb)   SpO2 99%   BMI 24.37 kg/m²      Physical Exam   Constitutional: She is oriented to person, place, and time. She appears well-developed and well-nourished. No distress.   HENT:   Head: Normocephalic and atraumatic.   Eyes: Conjunctivae and EOM are normal. Pupils are equal, round, and reactive to light.   Neck: Normal range of motion. Neck supple.   Cardiovascular: Regular rhythm.    Pulmonary/Chest: Effort normal and breath sounds normal.   Musculoskeletal:        Right shoulder: She exhibits decreased range of motion (normal passive ROM, decreased active by 50% due to pain.  ++ pain with internal rotation) and tenderness (mild lateral aspect of joint). She exhibits no bony tenderness, no swelling, no effusion, no deformity, no spasm, normal pulse and normal strength.        Arms:  Neurological: She is alert and oriented to person, place, and time.   Skin: Skin is warm and dry. No rash noted.   Psychiatric: She has a normal mood and affect. Her behavior is normal.   Vitals reviewed.              Assessment/Plan:     1. Rotator cuff tendinitis, right  ketorolac (TORADOL) injection 60 mg    tramadol (ULTRAM) 50 MG Tab    CONSENT FOR OPIATE PRESCRIPTION       -consistent with shoulder inflammation, suspect rotator cuff tenditis vs bursitis.  Unknown inciting factor however has previous trauma.    --Toradol shot given in clinic, patient tolerated well.  Monitored for 10 mins s/p shot.   -recommend heat/ice prn.  Gentle stretches daily.   -given sling for use for next 24-48 hours.   -Tramadol for bedtime/more severe pain. Caution drowsy.  Consent form signed.       Supportive care, differential diagnoses, and indications for immediate follow-up discussed with patient.   Pathogenesis of diagnosis discussed including typical length and natural progression.   Instructed to return to clinic " or nearest emergency department for any change in condition, further concerns, or worsening of symptoms.  Patient states understanding of the plan of care and discharge instructions.        Jaquelin Copeland P.A.-C.

## 2018-06-23 ENCOUNTER — OFFICE VISIT (OUTPATIENT)
Dept: URGENT CARE | Facility: PHYSICIAN GROUP | Age: 18
End: 2018-06-23
Payer: MEDICAID

## 2018-06-23 VITALS
TEMPERATURE: 98 F | RESPIRATION RATE: 16 BRPM | SYSTOLIC BLOOD PRESSURE: 110 MMHG | HEART RATE: 86 BPM | BODY MASS INDEX: 23.78 KG/M2 | DIASTOLIC BLOOD PRESSURE: 72 MMHG | HEIGHT: 66 IN | OXYGEN SATURATION: 98 % | WEIGHT: 148 LBS

## 2018-06-23 DIAGNOSIS — L08.9 INFECTION, SKIN: ICD-10-CM

## 2018-06-23 PROCEDURE — 99214 OFFICE O/P EST MOD 30 MIN: CPT | Performed by: PHYSICIAN ASSISTANT

## 2018-06-23 RX ORDER — SULFAMETHOXAZOLE AND TRIMETHOPRIM 800; 160 MG/1; MG/1
1 TABLET ORAL 2 TIMES DAILY
Qty: 14 TAB | Refills: 0 | Status: SHIPPED | OUTPATIENT
Start: 2018-06-23 | End: 2018-06-30

## 2018-06-23 NOTE — PROGRESS NOTES
Chief Complaint   Patient presents with   • Other     bumps on the inside of her nose x 1 1/2 weeks       HISTORY OF PRESENT ILLNESS: Patient is a 18 y.o. female who presents today for the following:    Patient comes in for evaluation of a nose piercing that she received a week or so ago. She's been cleaning it as directed but has noticed some pink drainage from the site. She denies any pain but has noticed some skin swelling around it. She denies any history of keloid scarring. She has not been cleaning it with hydrogen peroxide.    Patient Active Problem List    Diagnosis Date Noted   • SIRS (systemic inflammatory response syndrome) ([facility]) 03/23/2018     Priority: High   • Acute blood loss anemia 03/23/2018     Priority: Medium   • Postpartum hemorrhage 03/23/2018     Priority: Medium   • Thrombocytopenia ([facility]) 03/23/2018     Priority: Low   • Constipation 03/25/2018   • Iron deficiency 03/25/2018   • Edema 03/24/2018   • Eczema 07/06/2011   • Asthma, exercise induced 05/06/2010   • Right hip pain 02/12/2010   • Osteochondroma of tibia 02/12/2010       Allergies:Clindamycin; Pcn [penicillins]; and Suprax [cefixime]    Current Outpatient Prescriptions Ordered in [facility]   Medication Sig Dispense Refill   • mupirocin (BACTROBAN) 2 % Ointment Apply 1 Application to affected area(s) 3 times a day for 7 days. 1 Tube 0   • sulfamethoxazole-trimethoprim (BACTRIM DS) 800-160 MG tablet Take 1 Tab by mouth 2 times a day for 7 days. 14 Tab 0   • DULOXETINE HCL PO Take  by mouth.       No current Epic-ordered facility-administered medications on file.        Past Medical History:   Diagnosis Date   • Allergy    • Asthma, exercise induced    • FHx: cancer     MGGM brain and colon       Social History   Substance Use Topics   • Smoking status: Never Smoker   • Smokeless tobacco: Never Used   • Alcohol use No       No family status information on file.     Family History   Problem Relation Age of Onset   • Psychiatry Mother       "ANXIETY   • Lung Disease Maternal Aunt      ASTHMA   • Lung Disease Maternal Uncle      ASTHMA   • Heart Disease Maternal Grandfather      heart attack at age 45       Review of Systems:   Constitutional ROS: No unexpected change in weight, No weakness, No fatigue  Eye ROS: No recent significant change in vision, No eye pain, redness, discharge  Ear ROS: No drainage, No tinnitus or vertigo, No recent change in hearing  Mouth/Throat ROS: No teeth or gum problems, No bleeding gums, No tongue complaints  Neck ROS: No swollen glands, No significant pain in neck  Pulmonary ROS: No chronic cough, sputum, or hemoptysis, No dyspnea on exertion, No wheezing  Cardiovascular ROS: No diaphoresis, No edema, No palpitations  Gastrointestinal ROS: No change in bowel habits, No significant change in appetite, No nausea, vomiting, diarrhea, or constipation  Musculoskeletal/Extremities ROS: No peripheral edema, No pain, redness or swelling on the joints  Hematologic/Lymphatic ROS: No chills, No night sweats, No weight loss    Exam:  Blood pressure 110/72, pulse 86, temperature 36.7 °C (98 °F), resp. rate 16, height 1.676 m (5' 6\"), weight 67.1 kg (148 lb), SpO2 98 %, unknown if currently breastfeeding.  General: Well developed, well nourished. No distress.  HEENT: Piercing is noted in the right nostril. Skin thickening is noted around the opening both on the outside and inside of the nose. No purulent drainage is noted. The skin is pink at the area of thickening. Does not feel fluctuant.  Pulmonary: Unlabored respiratory effort.    Neurologic: Grossly nonfocal. No facial asymmetry noted.  Skin: Warm, dry, good turgor. No rashes in visible areas.   Psych: Normal mood. Alert and oriented x3. Judgment and insight is normal.    Assessment/Plan:  Question if this is infected versus hypertrophy of the skin but given the cosmetic changes that it may make, will start with mupirocin ointment to prevent any worsening infection if that is what " it is. Bactroban is to be used if symptoms worsen despite mupirocin.  1. Infection, skin  mupirocin (BACTROBAN) 2 % Ointment    sulfamethoxazole-trimethoprim (BACTRIM DS) 800-160 MG tablet    Superficial infection nasal piercing.

## 2018-08-20 ENCOUNTER — OFFICE VISIT (OUTPATIENT)
Dept: URGENT CARE | Facility: PHYSICIAN GROUP | Age: 18
End: 2018-08-20
Payer: MEDICAID

## 2018-08-20 VITALS
TEMPERATURE: 97.8 F | SYSTOLIC BLOOD PRESSURE: 110 MMHG | HEIGHT: 65 IN | OXYGEN SATURATION: 98 % | WEIGHT: 146 LBS | HEART RATE: 78 BPM | RESPIRATION RATE: 16 BRPM | DIASTOLIC BLOOD PRESSURE: 72 MMHG | BODY MASS INDEX: 24.32 KG/M2

## 2018-08-20 DIAGNOSIS — H65.93 FLUID LEVEL BEHIND TYMPANIC MEMBRANE OF BOTH EARS: ICD-10-CM

## 2018-08-20 DIAGNOSIS — J39.2 ULCER OF PHARYNX: ICD-10-CM

## 2018-08-20 PROCEDURE — 99213 OFFICE O/P EST LOW 20 MIN: CPT | Performed by: PHYSICIAN ASSISTANT

## 2018-08-20 ASSESSMENT — PAIN SCALES - GENERAL: PAINLEVEL: 6=MODERATE PAIN

## 2018-08-20 NOTE — PROGRESS NOTES
Chief Complaint   Patient presents with   • Pharyngitis     had a pumpkin seed stuck in her tonsil pulled it out now it seems infected       HISTORY OF PRESENT ILLNESS: Patient is a 18 y.o. female who presents today for the following:    Patient comes in for evaluation of a sore throat and the left side.  She states she was eating some pumpkin seeds a few days ago and got one stuck in her throat.  She pulled it out did not have any issues until yesterday.  She complains of severe pain on the left side, worse with swallowing.  Over-the-counter medication has not helped.  She reports a headache but otherwise denies fever, night sweats, chills, body aches, and nasal congestion.  She does complain of mild left ear pain.    Patient Active Problem List    Diagnosis Date Noted   • SIRS (systemic inflammatory response syndrome) ([facility]) 03/23/2018     Priority: High   • Acute blood loss anemia 03/23/2018     Priority: Medium   • Postpartum hemorrhage 03/23/2018     Priority: Medium   • Thrombocytopenia ([facility]) 03/23/2018     Priority: Low   • Constipation 03/25/2018   • Iron deficiency 03/25/2018   • Edema 03/24/2018   • Eczema 07/06/2011   • Asthma, exercise induced 05/06/2010   • Right hip pain 02/12/2010   • Osteochondroma of tibia 02/12/2010       Allergies:Clindamycin; Pcn [penicillins]; and Suprax [cefixime]    Current Outpatient Prescriptions Ordered in [facility]   Medication Sig Dispense Refill   • DULOXETINE HCL PO Take  by mouth.       No current Epic-ordered facility-administered medications on file.        Past Medical History:   Diagnosis Date   • Allergy    • Asthma, exercise induced    • FHx: cancer     MGGM brain and colon       Social History   Substance Use Topics   • Smoking status: Never Smoker   • Smokeless tobacco: Current User   • Alcohol use No       Family Status   Relation Status   • Mo (Not Specified)   • MAunt (Not Specified)   • MUnc (Not Specified)   • MGFa (Not Specified)     Family History   Problem  "Relation Age of Onset   • Psychiatry Mother         ANXIETY   • Lung Disease Maternal Aunt         ASTHMA   • Lung Disease Maternal Uncle         ASTHMA   • Heart Disease Maternal Grandfather         heart attack at age 45       Review of Systems:   Constitutional ROS: No unexpected change in weight, No weakness, No fatigue  Eye ROS: No recent significant change in vision, No eye pain, redness, discharge  Ear ROS: No drainage, No tinnitus or vertigo, No recent change in hearing  Mouth/Throat ROS: No teeth or gum problems, No bleeding gums, No tongue complaints  Neck ROS: No swollen glands, No significant pain in neck  Pulmonary ROS: No chronic cough, sputum, or hemoptysis, No dyspnea on exertion, No wheezing  Cardiovascular ROS: No diaphoresis, No edema, No palpitations  Gastrointestinal ROS: No change in bowel habits, No significant change in appetite, No nausea, vomiting, diarrhea, or constipation  Musculoskeletal/Extremities ROS: No peripheral edema, No pain, redness or swelling on the joints  Hematologic/Lymphatic ROS: No chills, No night sweats, No weight loss  Skin/Integumentary ROS: No edema, No evidence of rash, No itching      Exam:  Blood pressure 110/72, pulse 78, temperature 36.6 °C (97.8 °F), resp. rate 16, height 1.651 m (5' 5\"), weight 66.2 kg (146 lb), SpO2 98 %, unknown if currently breastfeeding.  General: Well developed, well nourished. No distress.  Eye: PERRL/EOMI; conjunctivae clear, lids normal.  ENMT: Lips without lesions, MMM.  Ulceration noted on the left tonsillar arch, otherwise oropharynx is negative. Bilateral TMs are within normal limits with clear fluid posteriorly bilaterally and slightly bulging.  Pulmonary: Unlabored respiratory effort. Lungs clear to auscultation, no wheezes, no rhonchi.  Cardiovascular: Regular rate and rhythm without murmur.    Neurologic: Grossly nonfocal. No facial asymmetry noted.  Lymph: No cervical lymphadenopathy noted.  Skin: Warm, dry, good turgor. No " rashes in visible areas.   Psych: Normal mood. Alert and oriented x3. Judgment and insight is normal.    Assessment/Plan:  Discussed likely viral etiology. Discussed appropriate over-the-counter symptomatic medication, and when to return to clinic.  Follow-up for worsening or persistent symptoms.  1. Ulcer of pharynx     2. Fluid level behind tympanic membrane of both ears

## 2020-03-17 ENCOUNTER — OFFICE VISIT (OUTPATIENT)
Dept: URGENT CARE | Facility: PHYSICIAN GROUP | Age: 20
End: 2020-03-17
Payer: MEDICAID

## 2020-03-17 VITALS
TEMPERATURE: 98.9 F | BODY MASS INDEX: 23.49 KG/M2 | DIASTOLIC BLOOD PRESSURE: 68 MMHG | SYSTOLIC BLOOD PRESSURE: 108 MMHG | OXYGEN SATURATION: 98 % | HEART RATE: 82 BPM | RESPIRATION RATE: 16 BRPM | WEIGHT: 141 LBS | HEIGHT: 65 IN

## 2020-03-17 DIAGNOSIS — J06.9 VIRAL URI: ICD-10-CM

## 2020-03-17 DIAGNOSIS — J02.9 PHARYNGITIS, UNSPECIFIED ETIOLOGY: ICD-10-CM

## 2020-03-17 LAB
INT CON NEG: NORMAL
INT CON POS: NORMAL
S PYO AG THROAT QL: NEGATIVE

## 2020-03-17 PROCEDURE — 99214 OFFICE O/P EST MOD 30 MIN: CPT | Performed by: NURSE PRACTITIONER

## 2020-03-17 PROCEDURE — 87880 STREP A ASSAY W/OPTIC: CPT | Performed by: NURSE PRACTITIONER

## 2020-03-17 SDOH — HEALTH STABILITY: MENTAL HEALTH: HOW OFTEN DO YOU HAVE A DRINK CONTAINING ALCOHOL?: 2-3 TIMES A WEEK

## 2020-03-17 ASSESSMENT — ENCOUNTER SYMPTOMS
HEADACHES: 1
WHEEZING: 0
VOMITING: 0
SWOLLEN GLANDS: 0
FEVER: 0
SHORTNESS OF BREATH: 0
SORE THROAT: 1
NAUSEA: 0
ABDOMINAL PAIN: 0
SINUS PAIN: 0
NECK PAIN: 0
HEARTBURN: 0
CHILLS: 0
SPUTUM PRODUCTION: 0
COUGH: 1
DIZZINESS: 0
FATIGUE: 1

## 2020-03-17 ASSESSMENT — FIBROSIS 4 INDEX: FIB4 SCORE: 0.51

## 2020-03-17 NOTE — PROGRESS NOTES
Subjective:   Raisa Merlos  is a 19 y.o. female who presents for Runny Nose (x 3 days ); Fatigue; and Headache        Fatigue   This is a new problem. Episode onset: 3 days ago. The problem occurs constantly. The problem has been gradually worsening. Associated symptoms include coughing, fatigue, headaches and a sore throat. Pertinent negatives include no abdominal pain, chills, congestion, fever, nausea, neck pain, rash, swollen glands or vomiting. Associated symptoms comments: Patient reports urgent care for new problem.  States she is had 3 days of worsening symptoms that include headache, fatigue as well as runny nose and sore throat.  Patient is taken over-the-counter DayQuil for mild relief of symptoms.  Denies fever, chills, nausea or vomiting.   Headache    Associated symptoms include coughing and a sore throat. Pertinent negatives include no abdominal pain, dizziness, fever, nausea, neck pain, swollen glands or vomiting.     Review of Systems   Constitutional: Positive for fatigue. Negative for chills and fever.   HENT: Positive for sore throat. Negative for congestion and sinus pain.    Respiratory: Positive for cough. Negative for sputum production, shortness of breath and wheezing.    Gastrointestinal: Negative for abdominal pain, heartburn, nausea and vomiting.   Musculoskeletal: Negative for neck pain.   Skin: Negative for rash.   Neurological: Positive for headaches. Negative for dizziness.     Past Medical History:   Diagnosis Date   • Allergy    • Asthma, exercise induced    • FHx: cancer     MGGM brain and colon      Past Surgical History:   Procedure Laterality Date   • FINGER ORIF  11/19/2010    Performed by WANG ROGERS at SURGERY SAME DAY [facility]      Social History     Socioeconomic History   • Marital status: Single     Spouse name: Not on file   • Number of children: Not on file   • Years of education: Not on file   • Highest education level: Not on file   Occupational  "History   • Not on file   Social Needs   • Financial resource strain: Not on file   • Food insecurity     Worry: Not on file     Inability: Not on file   • Transportation needs     Medical: Not on file     Non-medical: Not on file   Tobacco Use   • Smoking status: Never Smoker   • Smokeless tobacco: Current User   Substance and Sexual Activity   • Alcohol use: Yes     Frequency: 2-3 times a week   • Drug use: No   • Sexual activity: Not on file   Lifestyle   • Physical activity     Days per week: Not on file     Minutes per session: Not on file   • Stress: Not on file   Relationships   • Social connections     Talks on phone: Not on file     Gets together: Not on file     Attends [employer]ism service: Not on file     Active member of club or organization: Not on file     Attends meetings of clubs or organizations: Not on file     Relationship status: Not on file   • Intimate partner violence     Fear of current or ex partner: Not on file     Emotionally abused: Not on file     Physically abused: Not on file     Forced sexual activity: Not on file   Other Topics Concern   • Not on file   Social History Narrative    Single mom, only child, public school    6th grade.     Clindamycin; Pcn [penicillins]; and Suprax [cefixime]       Objective:   /68   Pulse 82   Temp 37.2 °C (98.9 °F) (Temporal)   Resp 16   Ht 1.651 m (5' 5\")   Wt 64 kg (141 lb)   SpO2 98%   BMI 23.46 kg/m²   Physical Exam  Vitals signs reviewed.   Constitutional:       Appearance: Normal appearance.   HENT:      Right Ear: Tympanic membrane, ear canal and external ear normal.      Left Ear: Tympanic membrane, ear canal and external ear normal.      Nose: Rhinorrhea present.      Mouth/Throat:      Lips: Pink.      Mouth: Mucous membranes are moist.      Pharynx: Uvula midline. Posterior oropharyngeal erythema present.      Tonsils: 2+ on the right. 2+ on the left.      Comments: Postnasal drip noted  Cardiovascular:      Rate and Rhythm: " Normal rate and regular rhythm.      Heart sounds: Normal heart sounds.   Pulmonary:      Effort: Pulmonary effort is normal.      Breath sounds: Normal breath sounds.   Lymphadenopathy:      Cervical: Cervical adenopathy present.   Skin:     General: Skin is warm.   Neurological:      Mental Status: She is alert and oriented to person, place, and time.   Psychiatric:         Mood and Affect: Mood normal.         Behavior: Behavior normal.         Thought Content: Thought content normal.         Judgment: Judgment normal.           Assessment/Plan:        1. Pharyngitis, unspecified etiology  - POCT Rapid Strep A - negative    2. Viral URI    Discussed physical examination findings as well as length of patient symptoms and negative rapid strep in clinic is likely viral etiology.  Discussed abortive care including increase fluids over-the-counter NSAIDs and Tylenol per 's instructions, starting Benadryl and using Flonase.    Work note provided    Supportive care, differential diagnoses, and indications for immediate follow-up discussed with patient.    Pathogenesis of diagnosis discussed including typical length and natural progression. Patient expresses understanding and agrees to plan.    Instructed patient to return to clinic for worsening symptoms or symptoms that persist for 7 to 10 days       Please note that this dictation was created using voice recognition software. I have made every reasonable attempt to correct obvious errors, but I expect that there are errors of grammar and possibly content that I did not discover before finalizing the note.

## 2020-03-17 NOTE — LETTER
March 17, 2020         Patient: Raisa Merlos   YOB: 2000   Date of Visit: 3/17/2020           To Whom it May Concern:    Raisa Merlos was seen in my clinic on 3/17/2020. She may return to work tomorrow.   If you have any questions or concerns, please don't hesitate to call.        Sincerely,           BAHMAN Contreras.  Electronically Signed

## 2022-11-30 ENCOUNTER — APPOINTMENT (OUTPATIENT)
Dept: URGENT CARE | Facility: PHYSICIAN GROUP | Age: 22
End: 2022-11-30
Payer: MEDICAID

## 2022-12-02 ENCOUNTER — APPOINTMENT (OUTPATIENT)
Dept: URGENT CARE | Facility: PHYSICIAN GROUP | Age: 22
End: 2022-12-02

## 2022-12-02 ENCOUNTER — APPOINTMENT (OUTPATIENT)
Dept: URGENT CARE | Facility: PHYSICIAN GROUP | Age: 22
End: 2022-12-02
Payer: MEDICAID